# Patient Record
Sex: MALE | Race: WHITE
[De-identification: names, ages, dates, MRNs, and addresses within clinical notes are randomized per-mention and may not be internally consistent; named-entity substitution may affect disease eponyms.]

---

## 2017-01-13 ENCOUNTER — HOSPITAL ENCOUNTER (INPATIENT)
Dept: HOSPITAL 84 - D.ER | Age: 82
LOS: 7 days | Discharge: HOME | DRG: 280 | End: 2017-01-20
Attending: FAMILY MEDICINE | Admitting: FAMILY MEDICINE
Payer: MEDICARE

## 2017-01-13 VITALS
WEIGHT: 190.96 LBS | BODY MASS INDEX: 26.73 KG/M2 | BODY MASS INDEX: 26.73 KG/M2 | HEIGHT: 71 IN | WEIGHT: 190.96 LBS | BODY MASS INDEX: 26.73 KG/M2 | HEIGHT: 71 IN

## 2017-01-13 VITALS — SYSTOLIC BLOOD PRESSURE: 154 MMHG | DIASTOLIC BLOOD PRESSURE: 94 MMHG

## 2017-01-13 DIAGNOSIS — I50.21: ICD-10-CM

## 2017-01-13 DIAGNOSIS — I25.10: ICD-10-CM

## 2017-01-13 DIAGNOSIS — N25.81: ICD-10-CM

## 2017-01-13 DIAGNOSIS — I13.0: ICD-10-CM

## 2017-01-13 DIAGNOSIS — I21.4: Primary | ICD-10-CM

## 2017-01-13 DIAGNOSIS — N17.9: ICD-10-CM

## 2017-01-13 DIAGNOSIS — J44.9: ICD-10-CM

## 2017-01-13 DIAGNOSIS — D75.82: ICD-10-CM

## 2017-01-13 DIAGNOSIS — Z95.2: ICD-10-CM

## 2017-01-13 DIAGNOSIS — N18.4: ICD-10-CM

## 2017-01-13 LAB
ALBUMIN SERPL-MCNC: 3.6 G/DL (ref 3.4–5)
ALP SERPL-CCNC: 83 U/L (ref 46–116)
ALT SERPL-CCNC: 20 U/L (ref 10–68)
ANION GAP SERPL CALC-SCNC: 16.8 MMOL/L (ref 8–16)
APPEARANCE UR: CLEAR
BACTERIA #/AREA URNS HPF: (no result) /HPF
BASOPHILS NFR BLD AUTO: 1.1 % (ref 0–2)
BILIRUB SERPL-MCNC: 0.46 MG/DL (ref 0.2–1.3)
BILIRUB SERPL-MCNC: NEGATIVE MG/DL
BUN SERPL-MCNC: 44 MG/DL (ref 7–18)
CALCIUM SERPL-MCNC: 8.5 MG/DL (ref 8.5–10.1)
CHLORIDE SERPL-SCNC: 108 MMOL/L (ref 98–107)
CK MB SERPL-MCNC: 1.9 U/L (ref 0–3.6)
CK SERPL-CCNC: 75 UL (ref 21–232)
CO2 SERPL-SCNC: 23.3 MMOL/L (ref 21–32)
COLOR UR: YELLOW
CREAT SERPL-MCNC: 2.9 MG/DL (ref 0.6–1.3)
EOSINOPHIL NFR BLD: 8.2 % (ref 0–7)
ERYTHROCYTE [DISTWIDTH] IN BLOOD BY AUTOMATED COUNT: 14.7 % (ref 11.5–14.5)
GLOBULIN SER-MCNC: 3.3 G/L
GLUCOSE SERPL-MCNC: 92 MG/DL (ref 74–106)
GLUCOSE SERPL-MCNC: NEGATIVE MG/DL
HCT VFR BLD CALC: 36 % (ref 42–54)
HGB BLD-MCNC: 11.9 G/DL (ref 13.5–17.5)
IMM GRANULOCYTES NFR BLD: 0.4 % (ref 0–5)
KETONES UR STRIP-MCNC: NEGATIVE MG/DL
LEUKOCYTE ESTERASE: NEGATIVE
LYMPHOCYTES NFR BLD AUTO: 21.3 % (ref 15–50)
MCH RBC QN AUTO: 32.2 PG (ref 26–34)
MCHC RBC AUTO-ENTMCNC: 33.1 G/DL (ref 31–37)
MCV RBC: 97.6 FL (ref 80–100)
MONOCYTES NFR BLD: 11.6 % (ref 2–11)
NEUTROPHILS NFR BLD AUTO: 57.4 % (ref 40–80)
NITRITE UR-MCNC: NEGATIVE MG/ML
NT-PROBNP SERPL-MCNC: 1236 PG/ML (ref 0–450)
OSMOLALITY SERPL CALC.SUM OF ELEC: 295 MOSM/KG (ref 275–300)
PH UR STRIP: 5 [PH] (ref 5–6)
PLATELET # BLD EST: (no result) 10*3/UL
PLATELET # BLD: 74 10X3/UL (ref 130–400)
PMV BLD AUTO: 11 FL (ref 7.4–10.4)
POTASSIUM SERPL-SCNC: 5.1 MMOL/L (ref 3.5–5.1)
PROT SERPL-MCNC: 6.9 G/DL (ref 6.4–8.2)
PROT UR-MCNC: (no result) MG/DL
RBC # BLD AUTO: 3.69 10X6/UL (ref 4.2–6.1)
RBC #/AREA URNS HPF: (no result) /HPF (ref 0–5)
SODIUM SERPL-SCNC: 143 MMOL/L (ref 136–145)
SP GR UR STRIP: 1.01 (ref 1–1.02)
TROPONIN I SERPL-MCNC: 0.1 NG/ML (ref 0–0.06)
UROBILINOGEN UR-MCNC: NORMAL MG/DL
WBC # BLD AUTO: 7.5 10X3/UL (ref 4.8–10.8)
WBC #/AREA URNS HPF: (no result) /HPF (ref 0–5)

## 2017-01-13 NOTE — HEMODYNAMI
PATIENT:KIRA UP                                  MEDICAL RECORD: [id number]
: [birth year]                                            LOCATION:[facility]     D.2118
[id number]                                       ADMISSION DATE: 17
 
 
 Generatedon:20179:38
Patient name: KIRA UP Patient #: [id number] Visit #: [id number] SSN: 
: 1935
Date of study: 2017
Page: Of
Hemodynamic Procedure Report
****************************
Patient Data
Patient Demographics
Procedure consent was obtained
First Name: KIRA         Gender: Male
Last Name: JEOVANNY            : 1935
Middle Initial: J           Age: 81 year(s)
Patient #: [id number]       Race: 
Visit #: [id number]
Accession #:
[id number]
Additional ID: [id number]
Contact details
Address: [address] [place] [address] Phone: [phone]
State: [place]
City: [place]
Zip code: [address]
Past Medical History
Allergies: No known allergies
Admission
Admission Data
Admission Date: 2017   Admission Time: 16:49
Admit Source: Other         Insurance Payor: Private
Room #: D.2118              health insurance
Height (in.): 72            BSA: 2.06 (m2)
Height (cm.): 182.88        BMI: 25.09 (kg/m2)
Weight (lbs.): 185
Weight (kg.): 83.91
Procedure
Procedure Types
Cath Procedure
Diagnostic Procedure
LHC
LHC w/Coronaries w/Grafts
Procedure Description
Procedure Date
Procedure Date: 2017
Procedure Start Time: 9:14
Procedure End Time: 9:36
Procedure Staff
Name                            Function
Haroldo Estrada MD                   Performing Physician
Arely Mcqueen RT             Scrub
Jonn Ayala RN                  Nurse
Franco Barbosa RN                Circulator
Anurag Maldonado RT                  Monitor
Procedure Data
Cath Procedure
 
Fluoroscopy
Diagnostic fluoroscopy      Total fluoroscopy Time: 6.1
time: 6.1 min               min
Diagnostic fluoroscopy      Total fluoroscopy dose: 428
dose: 428 mGy               mGy
Contrast Material
Contrast Material Type                       Amount (ml)
Isovue 300                                   90
Entry Location
Entry     Primary  Successful  Side  Size  Upsize Upsize Entry    Closure Succes
sful  Closure
Location                             (Fr)  1 (Fr) 2 (Fr) Remarks  Device        
      Remarks
Femoral                        Right 5 Fr                         Exoseal
artery
Estimated blood loss: 5 ml
Diagnostic catheters
Device Type               Used For           End Catheter
Placement
Cordis 5Fr JL 4.0         Procedure
Catheter (MP)
Cordis Infinity 5Fr AR    Procedure
MOD Catheter
Cordis Infinity 5Fr AR 2  Procedure
MOD catheter
Cordis Infinity 5Fr IM    Procedure
catheter
Cordis Infinity 5Fr       Aortic Root
Pigtail catheter          Angiography
Procedure Complications
No complications
Procedure Medications
Medication           Administration Route Dosage
Oxygen               NC                   2 l/min
Lidocaine 2%         added to field       20
Heparin Flush Bag    added to field       2 bags
(1000units/500ml NS)
0.9% NaCl            I.V.                 100 ml/hr
Versed               I.V.                 1 mg
Fentanyl             I.V.                 50 mcg
Versed               I.V.                 0.5 mg
Fentanyl             I.V.                 25 mcg
Versed               I.V.                 0.5 mg
Fentanyl             I.V.                 25 mcg
Hemodynamics
Rest
BSA: 2.06 (m2) O2 Consumption: Estimated: 237.95 (ml/min) O2 Consumption indexed
:
Estimated:115.51 (ml/min/m) Heart Rate: 74 (bpm)
Snapshots
Pre Cath      Intra         NCS           Post Cath
Vital Signs
Time    Heart  Resp   SPO2 etCO2   RA5vsoq NIBP (mmHg) Rhythm  Pain    Sedation
Rate   (ipm)  (%)  (mmHg)  (mmHg)                      Status  Level
(bpm)
8:58:06 79     17     96   0       0       154/79(114) NSR     0 (11)  10(A)
, No
pain
9:02:22 76     16     100  0       0       147/86(124) NSR     0 (11)  10(A)
, No
 
pain
9:06:38 73     16     98   0       0       130/76(102) NSR     0 (11)  10(A)
, No
pain
9:10:50 72     23     96   0       0       137/69(123) NSR     0 (11)  10(A)
, No
pain
9:15:02 74     14     98   0       0       141/75(106) NSR     0 (11)  9(A)
, No
pain
9:19:14 76     16     98   0       0       134/79(112) NSR     0 (11)  9(A)
, No
pain
9:23:28 73     14     99   0       0       136/65(110) NSR     0 (11)  9(A)
, No
pain
9:27:40 79     14     96   0       0       123/75(97)  NSR     0 (11)  9(A)
, No
pain
9:31:45 82     17     97   0       0       128/79(96)  NSR     0 (11)  10(A)
, No
pain
9:35:57 73     13     98   0       0       137/69(103) NSR     0 (11)  10(A)
, No
pain
Medications
Time    Medication       Route  Dose  Verified Delivered Reason     Notes  Effec
tiveness
by       by
9:00:36 Oxygen           NC     2     Haroldo     Buffie    used for
l/min Sean Ayala RN   procedure
9:03:02 Lidocaine 2%     added  20ml  Haroldo     Haroldo      for local
to     vial  Sean Estrada MD  anesthetic
field
9:03:07 Heparin Flush    added  2     Haroldo     Haroldo      used for
Bag              to     bags  Sean Estrada MD  procedure
(1000units/500ml field
NS)
9:03:17 0.9% NaCl        I.V.   100   Haroldo     Buffie    Per
ml/hr Sean Ayala RN   physician
9:09:28 Versed           I.V.   1 mg  Haroldo     Buffie    for
Sean Ayala RN   sedation
9:09:34 Fentanyl         I.V.   50    Haroldo     Buffie    for
mcg   Sean Ayala RN   sedation
9:15:18 Versed           I.V.   0.5   Haroldo     Buffie    for
mg    Sean Ayala RN   sedation
9:15:23 Fentanyl         I.V.   25    Haroldo     Buffie    for
mcg   Sean Ayala RN   sedation
9:23:13 Versed           I.V.   0.5   Haroldo     Buffie    for
mg    Sean Ayala RN   sedation
9:23:17 Fentanyl         I.V.   25    Haroldo     Buffie    for
mcg   Sean Ayala RN   sedation
Procedure Log
Time     Note
8:45:26  Diagnostic Cath Status : Elective
8:45:53  Franco Barbosa RN sent for patient. Start room use.
8:45:54  Time tracking: Regular hours
8:45:57  Plan of Care:Hemodynamics will remain stable., Cardiac
rhythm will remain stable., Comfort level will be
maintained., Respiratory function will remain
 
adequate., Patient/ family verbilizes understanding of
procedure., Procedure tolerated without complication.,
Recovers from procedure without complications..
8:53:57  Patient received from PCU to CCL 2 Alert and oriented.
Tansferred to table in Supine position.
8:53:58  Warm blankets applied, and nathaniel hugger turned on for
patient comfort.
8:53:58  Correct patient and procedure confirmed by team.
8:53:59  Signed procedure consent form obtained from patient.
8:54:00  ECG and BP/O2 sat monitors applied to patient.
8:54:01  Full Disclosure recording started
8:56:56  Vital chart was started
9:00:36  Oxygen 2 l/min NC was given by Jonn Ayala RN; used
for procedure;
9:00:38  Baseline sample Acquired.
9:00:43  Rhythm: sinus rhythm
9:01:11  H&P Date Dictated: 2017 Within 30 days and on
chart..
9:01:12  Pre-procedure instructions explained to patient.
9:01:12  Pre-op teaching completed and patient verbalized
understanding.
9:01:15  Family in patients room.
9:01:16  Patient NPO since Midnight.
9:01:24  Patient allergic to No known allergies
9:01:27  Is the patient allergic to Iodine/contrast media? No.
9:01:29  Is patient on blood thinner?No
9:01:30  Patient diabetic? No.
9:01:36  Previous problem with sedation/anesthesia? No ?
9:01:37  Snore? No
9:01:38  Sleep apnea? No
9:01:39  Deviated septum? No
9:01:40  Opens mouth fully? Yes
9:01:41  Sticks out tongue? Yes
9:01:44  Airway obstruction? Yes COPD
9:01:47  Dentures? No ?
9:01:52  Pre procedure: right dorsailis pedis pulse 1+
Palpable, but thready & weak; easily obliterated
9:02:02  Patient pain scale 0/10 ?.
9:02:05  IV patent on arrival in left hand with 0.9% NaCl at
O.
9:02:55  Lab Result : Hemoglobin 11.4 g/dl
9:02:55  Lab Result : Creatinine 2.6 mg/dl
9:02:58  Lab results completed and on chart.
9:03:01  Right groin area was prepped with chlora-prep and
draped in sterile fashion
9:03:02  Lidocaine 2% 20ml vial added to field was given by
Haroldo Estrada MD; for local anesthetic;
9:03:03  Alarms reviewed by R. N.
9:03:03  Sharps counted by scrub and verified by R.N.
9:03:05  Use device set Femoral Dx
9:03:07  Heparin Flush Bag (1000units/500ml NS) 2 bags added to
field was given by Haroldo Estrada MD; used for procedure;
9:03:07  Tegaderm 4 x 4 opened to sterile field.
9:03:08  Acist Manifold opened to sterile field.
9:03:09  Acist Hand Control opened to sterile field.
9:03:10  Acist Syringe opened to sterile field.
9:03:10  Bag Decanter opened to sterile field.
9:03:10  Cardinal Cath Pack opened to sterile field.
9:03:11  Terumo 5Fr [place] Sheath opened to sterile field.
9:03:11  St Josh 260cm J .035 wire opened to sterile field.
 
9:03:17  0.9% NaCl 100 ml/hr I.V. was given by Jonn Ayala RN;
Per physician;
::18  --------ALL STOP TIME OUT------
9::18  Final Timeout: patient, procedure, and site verified
with staff and physician. All members of the team are
in agreement.
9:03:20  Right groin site verified by team.
9:03:22  Physical assessment completed. ASA score P 3 - A
patient with severe systemic disease as per Haroldo Estrada MD.
9:03:25  Sedation plan: IV Moderate Sedation Versed, Fentanyl
9:05:04  Admit Source: Other
9:05:12  Patient Height : 72 cm
9:05:21  Patient Weight : 185 kg
9:05:22  Insurance Payor : Private health insurance
9:05:44  **ACC** Patient presents with Unstable Angina CCS
Anginal Class 3--Marked limitation of physical
activity, angina occurs with ordinary activity..
9:05:47  **ACC**Patient has been prescribed/administered the
following anti-anginal medication within the last 2
weeks: None
9:08:37  Zero performed for pressure channel P1
9:08:40  Zero performed for pressure channel P1
9:08:53  Zero performed for pressure channel P1
9::28  Versed 1 mg I.V. was given by Jonn Ayala RN; for
sedation;
9:09:34  Fentanyl 50 mcg I.V. was given by Jonn Ayala RN; for
sedation;
9:09:39  Procedure type changed to Cath procedure, Diagnostic
procedure, LHC, LHC w/Coronaries w/Grafts
9:13:53  Procedure started.
9:14:07  Local anesthetic to right femoral artery with
Lidocaine 2% by Haroldo Estrada MD.**INITIAL ACCESS ONLY**
9:15:05  A 5 Fr sheath was inserted into the Right Femoral
artery
9:15:18  Versed 0.5 mg I.V. was given by Jonn Ayala RN; for
sedation;
9:15:23  Fentanyl 25 mcg I.V. was given by Jonn Ayala RN; for
sedation;
9:16:52  A Cordis 5Fr JL 4.0 Catheter () was advanced over
the wire and used for Procedure.
9:17:00  LCA angiography performed.
9:17:09  Catheter removed.
9:18:22  A Cordis Infinity 5Fr AR MOD Catheter was advanced
over the wire and used for Procedure.
9:19:04  Catheter removed.
9:19:17  A Cordis Infinity 5Fr AR 2 MOD catheter was advanced
over the wire and used for Procedure.
9:21:16  SVG TO DIAG AND OM
9:22:14  SVG to RCA angiography performed.
9:23:08  Catheter removed.
9:23:13  Versed 0.5 mg I.V. was given by Jonn Ayala RN; for
sedation;
9:23:17  Fentanyl 25 mcg I.V. was given by Jonn Ayala RN; for
sedation;
9:23:40  A Cordis Infinity 5Fr IM catheter was advanced over
the wire and used for Procedure.
9:27:05  LIMA to LAD angiography performed.
9:28:21  Catheter removed.
9:29:20  A Cordis Infinity 5Fr Pigtail catheter was advanced
 
over the wire and used for Aortic Root Angiography.
9:29:51  Aortic Root visualized
9:30:15  Catheter removed.
9:31:16  Cordis 5Fr Exoseal opened to sterile field.
9:31:33  Sheath removed intact; hemostasis achieved with
Exoseal to the Right Femoral artery.
9:31:39  Procedure ended.(Physican Out)
9:33:58  Fluoroscopy time 06.10 minutes.
9:34:01  Fluoroscopy dose: 428 mGy
9:34:01  Flurop Dose total: 428
9:34:16  Contrast amount:Isovue 300 90ml.
9:34:17  Sharps counted by scrub and verified by R.N.
9:34:22  Insertion/operative site no bleeding no hematoma.
9:34:24  Post-op/insertion site Right Femoral artery dressed
using a 4 x 4 and Tegaderm.
9:34:28  Post right femoral artery:stable, soft, clean and dry
9:34:30  Post Procedure Pulses reassessed and unchanged
9:34:34  Post-procedure physical assessment completed. ASA
score P 3 - A patient with severe systemic disease as
per Haroldo Estrada MD.
9:34:37  Post procedure rhythm: unchanged.
9:34:45  Estimated blood loss: 5 ml
9:34:46  Post procedure instruction explained to
patient.Patient verbalizes understanding.
9:34:47  Patient needs reinforcement of post procedure
teaching.
9:36:26  Procedure and supply charges have been captured,
reviewed, submitted and are correct.
9:36:30  Procedure Complication : No complications
9:36:33  Vital chart was stopped
9:36:46  See physician's report for complete and final results.
9:36:49  Report given to PCU.
9:36:52  Patient transfered to PCU with Stretcher.
9:36:54  Procedure ended.
9:36:54  Full Disclosure recording stopped
9:37:09  End room use (Document Last)
Device Usage
Item     Manufacture  Quantity  Catalog     Hospital Part    Current Minimal Lot
# /
Name                            Number      Charge   Number  Stock   Stock   Ser
ial#
Code
Tegaderm            1         [id number]       [id number]   997212  [id number]  5
4 x 4
Acist    Acist        1         35477       758200   659039  003632  5
Manifold Medical
Systems Inc
Acist    Acist        1         56923       741415   467959  510184  5
Hand     Medical
Control  Systems Inc
Acist    Acist        1         20269       518167   570377  012215  20
Syringe  Medical
Systems Inc
Bag      Microtek     1         S       993549   14964   625597  5
Decanter [employer] Inc.
Cardinal Cardinal     1         [id number]  881448   14235   402989  5
Cath     Health
Pack
Terumo   Terumo       1         [id number]      487971   877109  671728  40
5Fr
 
[place]
Sheath
St Josh  St Josh      1         909391      075852   924284  180650  30
260cm J
.035
wire
Cordis   Cardinal     1                                      652555  5
5Fr Dynamics Expert   Health
4.0
Catheter
(MP)
Cordis   Cardinal     1         [id number]     695327   929945  017406  15
KOJI Drinks
5Fr AR
MOD
Catheter
Cordis   Cardinal     1         [id number]     332485   741190  744873  20
Infinity Health
5Fr AR 2
MOD
catheter
Cordis   Cardinal     1         495071T     080448   190006  153411  5
Infinity Health
5Fr IM
catheter
Cordis   Cardinal     1         155190S     305959   598498  681251  5
Infinity Health
5Fr
Pigtail
catheter
Cordis   Cardinal     1                625818   292030  408534  10
5Fr      Health
Exoseal
Signature Audit [place]
Stage           Time        Signature      Unsigned
Intra-Procedure 2017   Anurag Maldonado
9:38:05 AM  RT(R)
Signatures
Monitor : Anurag Maldonado RT Signature :
______________________________
Date : ______________ Time :
______________
 
 
 
 
 
 
 
 
 
 
 
 
 
[address] [place] [address]

## 2017-01-14 VITALS — DIASTOLIC BLOOD PRESSURE: 80 MMHG | SYSTOLIC BLOOD PRESSURE: 179 MMHG

## 2017-01-14 VITALS — SYSTOLIC BLOOD PRESSURE: 157 MMHG | DIASTOLIC BLOOD PRESSURE: 58 MMHG

## 2017-01-14 VITALS — DIASTOLIC BLOOD PRESSURE: 60 MMHG | SYSTOLIC BLOOD PRESSURE: 131 MMHG

## 2017-01-14 VITALS — SYSTOLIC BLOOD PRESSURE: 141 MMHG | DIASTOLIC BLOOD PRESSURE: 75 MMHG

## 2017-01-14 VITALS — DIASTOLIC BLOOD PRESSURE: 84 MMHG | SYSTOLIC BLOOD PRESSURE: 165 MMHG

## 2017-01-14 VITALS — DIASTOLIC BLOOD PRESSURE: 65 MMHG | SYSTOLIC BLOOD PRESSURE: 131 MMHG

## 2017-01-14 LAB
CREATININE - URINE: 55.5 MG/DL (ref 30–125)
PROT UR-MCNC: 132.6 MG/DL (ref 0–11.9)

## 2017-01-14 NOTE — HP
PATIENT: KIRA UP                                 MEDICAL RECORD: [id number]
ACCOUNT: [id number]                                    LOCATION:[address]
: [birth year]                                            ADMISSION DATE: 17
                                                         
 
                             HISTORY AND PHYSICAL EXAMINATION
 
 
HISTORY OF PRESENT ILLNESS:  An 81-year-old  male, had acute episode of
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 
He had been going up and down the stairs and felt very short of breath, felt
like his heart was racing, sat down and felt like he is going to pass out.  He
was evaluated there and shipped to the urgent care clinic for further evaluation
and transferred to the Emergency Room via ambulance.
 
PAST MEDICAL HISTORY:  Significant for significant cardiovascular disease, has
had quadruple bypass, valve replacement and abdominal aortic aneurysm repair. 
His cardiologist is Dr. Walton and he also has a history of COPD and sees Dr. Crabtree.  Also, has had recent hernia repair and prior remote hernia repair. 
Former smoker.
 
CURRENT MEDICATIONS:  Inhalers from Dr. Crabtree.  Recent medication:  Not clear on
the name, he does not have with him, but for pulmonary hypertension.  Plavix and
B12.
 
ALLERGIES:  No known drug allergies.
 
REVIEW OF SYSTEMS:
GENERAL:  No acute change in weight or appetite.
HEENT:  No cephalgia, visual changes, tinnitus, epistaxis or dysphagia.
CARDIOVASCULAR:  Significant for the above.
PULMONARY:  Denies hemoptysis, denies night sweats.  Does admit to shortness of
breath.
GASTROINTESTINAL:  Denies hematemesis, hematochezia or melena.
GENITOURINARY:  Denies dysuria.  Denies any change in frequency.
MUSCULOSKELETAL:  No acute changes.
ENDOCRINE:  Denies polyuria, polydipsia, or polyphagia.
 
PHYSICAL EXAMINATION:
VITAL SIGNS:  Temperature 98.2, blood pressure is 156/89, heart rate 84,
respirations 24, and O2 sats 84% room air.
GENERAL:  Alert, oriented, mild distress secondary to above symptoms somewhat
improved with supplemental O2.
HEENT:  Head is normocephalic and atraumatic.  Eyes:  Pupils equally round and
reactive to light and accommodation.  Extraocular muscles intact.  Conjunctiva
are not injected.  Ears:  Canals patent, TMs are intact.  Nose:  Nares patent
without drainage.  Throat:  No erythema, no exudates.
NECK:  Supple.  No lymphadenopathy.
HEART:  Regular rate and rhythm.  No S3 or S4.  No rub.
LUNGS:  Clear to auscultation bilaterally.  Breathing is nonlabored.
ABDOMEN:  Soft and nontender.  Bowel sounds all 4 quadrants.
EXTREMITIES:  Present times 4, no edema.
NEUROLOGIC:  No focal deficits.
 
DIAGNOSTIC DATA:  EKG shows normal sinus rhythm, left axis deviation, LVH, QRS
widening, ventricular rate 69.
 
LABORATORY DATA:  INR is 1.14, CK-MB 1.9, troponin 0.096.  BNP 1236, creatinine
 
 
 
HISTORY AND PHYSICAL                           [id number]    KIRA UP         
 
 
elevated.
 
ASSESSMENT AND PLAN:  Acute congestive heart failure, likely non-ST elevation
myocardial infarction, chronic kidney disease.  The patient is admitted, cycle
enzymes.  Cardiology consulted, nephrology consulted.  Resume home medications. 
Supportive care with supplemental O2, DuoNebs.
 
TRANSINT:[id number] Voice Confirmation ID: [id number] DOCUMENT ID: [id number]
 
 
                                           
                                           EUSEBIA MOHAMUD DO            
 
 
 
Electronically Signed by EUSEBIA HOWE on 17 at 1321
 
 
 
 
 
 
 
 
 
 
 
 
 
 
 
 
 
 
 
 
 
 
 
 
 
 
 
 
 
 
 
CC:                                                             [id number]
DICTATION DATE: 17     :     17      ADM IN  
                                                                              
[address] [place] [address]

## 2017-01-14 NOTE — NUR
PT RESTING WELL WITHOUT C/O OR DISTRESS NOTED. NO NEEDS VOICED. CALL LIGHT
WITHIN REACH. WILL CONT TO MONITOR.

## 2017-01-14 NOTE — NUR
ASSESSMENT COMPLETED. TELEMERTY SHOWS SR. IV TO LEFT FOREARM  AT 75 CC/HR. UP
AB LINDA. DENIES ANY NEEDS. CALL LIGHT IN REACH WITH SR UP

## 2017-01-15 VITALS — SYSTOLIC BLOOD PRESSURE: 153 MMHG | DIASTOLIC BLOOD PRESSURE: 85 MMHG

## 2017-01-15 VITALS — DIASTOLIC BLOOD PRESSURE: 66 MMHG | SYSTOLIC BLOOD PRESSURE: 117 MMHG

## 2017-01-15 VITALS — DIASTOLIC BLOOD PRESSURE: 80 MMHG | SYSTOLIC BLOOD PRESSURE: 148 MMHG

## 2017-01-15 VITALS — SYSTOLIC BLOOD PRESSURE: 121 MMHG | DIASTOLIC BLOOD PRESSURE: 43 MMHG

## 2017-01-15 VITALS — DIASTOLIC BLOOD PRESSURE: 43 MMHG | SYSTOLIC BLOOD PRESSURE: 130 MMHG

## 2017-01-15 VITALS — SYSTOLIC BLOOD PRESSURE: 105 MMHG | DIASTOLIC BLOOD PRESSURE: 60 MMHG

## 2017-01-15 LAB
ANION GAP SERPL CALC-SCNC: 14.2 MMOL/L (ref 8–16)
BASOPHILS NFR BLD AUTO: 0.5 % (ref 0–2)
BUN SERPL-MCNC: 52 MG/DL (ref 7–18)
C3 SERPL-MCNC: 80 MG/DL (ref 90–207)
C4 SERPL-MCNC: 17.7 MG/DL (ref 17.4–52.2)
CALCIUM SERPL-MCNC: 8 MG/DL (ref 8.5–10.1)
CHLORIDE SERPL-SCNC: 112 MMOL/L (ref 98–107)
CO2 SERPL-SCNC: 23.6 MMOL/L (ref 21–32)
CREAT SERPL-MCNC: 2.8 MG/DL (ref 0.6–1.3)
EOSINOPHIL NFR BLD: 5.6 % (ref 0–7)
ERYTHROCYTE [DISTWIDTH] IN BLOOD BY AUTOMATED COUNT: 14.9 % (ref 11.5–14.5)
GLUCOSE SERPL-MCNC: 90 MG/DL (ref 74–106)
HCT VFR BLD CALC: 33 % (ref 42–54)
HGB BLD-MCNC: 10.9 G/DL (ref 13.5–17.5)
IMM GRANULOCYTES NFR BLD: 0.2 % (ref 0–5)
LYMPHOCYTES NFR BLD AUTO: 27.6 % (ref 15–50)
MCH RBC QN AUTO: 31.9 PG (ref 26–34)
MCHC RBC AUTO-ENTMCNC: 33 G/DL (ref 31–37)
MCV RBC: 96.5 FL (ref 80–100)
MONOCYTES NFR BLD: 9.3 % (ref 2–11)
NEUTROPHILS NFR BLD AUTO: 56.8 % (ref 40–80)
OSMOLALITY SERPL CALC.SUM OF ELEC: 302 MOSM/KG (ref 275–300)
PLATELET # BLD: 79 10X3/UL (ref 130–400)
PMV BLD AUTO: 11 FL (ref 7.4–10.4)
POTASSIUM SERPL-SCNC: 4.8 MMOL/L (ref 3.5–5.1)
RBC # BLD AUTO: 3.42 10X6/UL (ref 4.2–6.1)
SODIUM SERPL-SCNC: 145 MMOL/L (ref 136–145)
WBC # BLD AUTO: 6.6 10X3/UL (ref 4.8–10.8)

## 2017-01-15 NOTE — NUR
INITIAL ROUNDS COMPLETED AT 1915 HRS.  PT DENIED ANUY DISCOMFORT.  ASSESSMENT
COMPLETED AT 2000 HRS.  VSS.  SR WITH BBB PER CM HR 86.  IV TO LFA WITH D5NS
AT 100CC/HR.  IV PATENT.  LUNGS DIMINISHED IN BASES BILAT.  SYSTOLIC MURMUR
NOTED.  PT UP AD LINDA.  PT CURRENTLY WATCHING TV; DENIES ANY DISCOMFORT.  SR UP
X2, CALL LIGHT WITHIN REACH.

## 2017-01-15 NOTE — NUR
ASSESSMENT COMPLETED. DENIES ANY NEEDS. TELEMERTY SHOWS SR 68. IV TO LEFT AC
WITH D5NS AT 75. CALL LIGHT IN REACH WITH SR UP. WILL MONITOR

## 2017-01-16 VITALS — SYSTOLIC BLOOD PRESSURE: 148 MMHG | DIASTOLIC BLOOD PRESSURE: 77 MMHG

## 2017-01-16 VITALS — DIASTOLIC BLOOD PRESSURE: 58 MMHG | SYSTOLIC BLOOD PRESSURE: 145 MMHG

## 2017-01-16 VITALS — SYSTOLIC BLOOD PRESSURE: 153 MMHG | DIASTOLIC BLOOD PRESSURE: 83 MMHG

## 2017-01-16 LAB
ANION GAP SERPL CALC-SCNC: 16 MMOL/L (ref 8–16)
BASOPHILS NFR BLD AUTO: 0.8 % (ref 0–2)
BUN SERPL-MCNC: 51 MG/DL (ref 7–18)
CALCIUM SERPL-MCNC: 8.1 MG/DL (ref 8.5–10.1)
CHLORIDE SERPL-SCNC: 112 MMOL/L (ref 98–107)
CK MB SERPL-MCNC: 1.4 U/L (ref 0–3.6)
CO2 SERPL-SCNC: 21.5 MMOL/L (ref 21–32)
CREAT SERPL-MCNC: 2.6 MG/DL (ref 0.6–1.3)
EOSINOPHIL NFR BLD: 9 % (ref 0–7)
ERYTHROCYTE [DISTWIDTH] IN BLOOD BY AUTOMATED COUNT: 14.9 % (ref 11.5–14.5)
GLUCOSE SERPL-MCNC: 99 MG/DL (ref 74–106)
HCT VFR BLD CALC: 30.8 % (ref 42–54)
HGB BLD-MCNC: 10.5 G/DL (ref 13.5–17.5)
IMM GRANULOCYTES NFR BLD: 0.3 % (ref 0–5)
LYMPHOCYTES NFR BLD AUTO: 27.4 % (ref 15–50)
MCH RBC QN AUTO: 32.7 PG (ref 26–34)
MCHC RBC AUTO-ENTMCNC: 34.1 G/DL (ref 31–37)
MCV RBC: 96 FL (ref 80–100)
MONOCYTES NFR BLD: 8.5 % (ref 2–11)
NEUTROPHILS NFR BLD AUTO: 54 % (ref 40–80)
OSMOLALITY SERPL CALC.SUM OF ELEC: 302 MOSM/KG (ref 275–300)
PLATELET # BLD: 70 10X3/UL (ref 130–400)
PMV BLD AUTO: 10 FL (ref 7.4–10.4)
POTASSIUM SERPL-SCNC: 4.5 MMOL/L (ref 3.5–5.1)
RBC # BLD AUTO: 3.21 10X6/UL (ref 4.2–6.1)
SODIUM SERPL-SCNC: 145 MMOL/L (ref 136–145)
TROPONIN I SERPL-MCNC: 0.14 NG/ML (ref 0–0.06)
WBC # BLD AUTO: 6 10X3/UL (ref 4.8–10.8)

## 2017-01-17 VITALS — SYSTOLIC BLOOD PRESSURE: 139 MMHG | DIASTOLIC BLOOD PRESSURE: 86 MMHG

## 2017-01-17 VITALS — SYSTOLIC BLOOD PRESSURE: 141 MMHG | DIASTOLIC BLOOD PRESSURE: 82 MMHG

## 2017-01-17 VITALS — DIASTOLIC BLOOD PRESSURE: 89 MMHG | SYSTOLIC BLOOD PRESSURE: 155 MMHG

## 2017-01-17 VITALS — DIASTOLIC BLOOD PRESSURE: 73 MMHG | SYSTOLIC BLOOD PRESSURE: 125 MMHG

## 2017-01-17 VITALS — SYSTOLIC BLOOD PRESSURE: 175 MMHG | DIASTOLIC BLOOD PRESSURE: 73 MMHG

## 2017-01-17 VITALS — DIASTOLIC BLOOD PRESSURE: 67 MMHG | SYSTOLIC BLOOD PRESSURE: 137 MMHG

## 2017-01-17 LAB
ALBUMIN SERPL ELPH-MCNC: 3.1 G/DL (ref 2.9–4.4)
ALBUMIN/GLOB SERPL: 1.1 {RATIO} (ref 0.7–1.7)
ALPHA1 GLOB SERPL ELPH-MCNC: 0.2 G/DL (ref 0–0.4)
ALPHA2 GLOB SERPL ELPH-MCNC: 0.7 G/DL (ref 0.4–1)
ANION GAP SERPL CALC-SCNC: 16 MMOL/L (ref 8–16)
APTT BLD: 32.1 SECONDS (ref 22.8–39.4)
B-GLOBULIN SERPL ELPH-MCNC: 0.9 G/DL (ref 0.7–1.3)
BASOPHILS NFR BLD AUTO: 1 % (ref 0–2)
BUN SERPL-MCNC: 47 MG/DL (ref 7–18)
CALCIUM SERPL-MCNC: 8.2 MG/DL (ref 8.5–10.1)
CHLORIDE SERPL-SCNC: 113 MMOL/L (ref 98–107)
CO2 SERPL-SCNC: 20.5 MMOL/L (ref 21–32)
CREAT SERPL-MCNC: 2.5 MG/DL (ref 0.6–1.3)
EOSINOPHIL NFR BLD: 12.2 % (ref 0–7)
ERYTHROCYTE [DISTWIDTH] IN BLOOD BY AUTOMATED COUNT: 15 % (ref 11.5–14.5)
GAMMA GLOB SERPL ELPH-MCNC: 0.9 G/DL (ref 0.4–1.8)
GLUCOSE SERPL-MCNC: 96 MG/DL (ref 74–106)
HCT VFR BLD CALC: 31.8 % (ref 42–54)
HCV AB S/CO SERPL IA: <0.1 (ref 0–0.9)
HGB BLD-MCNC: 10.8 G/DL (ref 13.5–17.5)
IMM GRANULOCYTES NFR BLD: 0.3 % (ref 0–5)
INR PPP: 1.21 (ref 0.85–1.17)
LYMPHOCYTES NFR BLD AUTO: 24.9 % (ref 15–50)
MCH RBC QN AUTO: 32.2 PG (ref 26–34)
MCHC RBC AUTO-ENTMCNC: 34 G/DL (ref 31–37)
MCV RBC: 94.9 FL (ref 80–100)
MONOCYTES NFR BLD: 10.3 % (ref 2–11)
NEUTROPHILS NFR BLD AUTO: 51.3 % (ref 40–80)
OSMOLALITY SERPL CALC.SUM OF ELEC: 300 MOSM/KG (ref 275–300)
PLATELET # BLD: 65 10X3/UL (ref 130–400)
PMV BLD AUTO: 9.9 FL (ref 7.4–10.4)
POTASSIUM SERPL-SCNC: 4.5 MMOL/L (ref 3.5–5.1)
PROT SERPL-MCNC: 5.8 G/DL (ref 6–8.5)
PROTHROMBIN TIME: 15.2 SECONDS (ref 11.6–15)
RBC # BLD AUTO: 3.35 10X6/UL (ref 4.2–6.1)
SODIUM SERPL-SCNC: 145 MMOL/L (ref 136–145)
WBC # BLD AUTO: 6.3 10X3/UL (ref 4.8–10.8)

## 2017-01-17 NOTE — NUR
PT ASSESSMENT COMPLETED NO DISTRESS OBSERVED PT LAYING IN BED ON ROOM AIR
RESPERATIONS EVEN AND UNLABORED, PT STATED " I BREATH OKAY UNLESS I WALK A
CERTIAN DISTANCE THEN I GET SHORT OF BREATH" ADVISED  PT ABOUT WEARING O2 WHEN
WALKING AND PT STATED " I DONT WANT TO DO THAT BECAUSE THEN I WILL NEVER GET
OFF OF IT" TEACHING FOR COPD AND CHF DONE AND PT VERBALIZED UNDERSTANDING.
SRX2 BED LOW AND LOCKED CALL LIGHT IN REACH WILL MONITOR

## 2017-01-18 VITALS — SYSTOLIC BLOOD PRESSURE: 156 MMHG | DIASTOLIC BLOOD PRESSURE: 87 MMHG

## 2017-01-18 VITALS — DIASTOLIC BLOOD PRESSURE: 81 MMHG | SYSTOLIC BLOOD PRESSURE: 144 MMHG

## 2017-01-18 VITALS — SYSTOLIC BLOOD PRESSURE: 156 MMHG | DIASTOLIC BLOOD PRESSURE: 51 MMHG

## 2017-01-18 VITALS — DIASTOLIC BLOOD PRESSURE: 70 MMHG | SYSTOLIC BLOOD PRESSURE: 140 MMHG

## 2017-01-18 VITALS — SYSTOLIC BLOOD PRESSURE: 149 MMHG | DIASTOLIC BLOOD PRESSURE: 76 MMHG

## 2017-01-18 VITALS — SYSTOLIC BLOOD PRESSURE: 149 MMHG | DIASTOLIC BLOOD PRESSURE: 80 MMHG

## 2017-01-18 LAB
ANION GAP SERPL CALC-SCNC: 15.8 MMOL/L (ref 8–16)
BASOPHILS NFR BLD AUTO: 0.8 % (ref 0–2)
BUN SERPL-MCNC: 56 MG/DL (ref 7–18)
CALCIUM SERPL-MCNC: 8.5 MG/DL (ref 8.5–10.1)
CHLORIDE SERPL-SCNC: 107 MMOL/L (ref 98–107)
CO2 SERPL-SCNC: 24.5 MMOL/L (ref 21–32)
CREAT SERPL-MCNC: 2.6 MG/DL (ref 0.6–1.3)
EOSINOPHIL NFR BLD: 10.5 % (ref 0–7)
ERYTHROCYTE [DISTWIDTH] IN BLOOD BY AUTOMATED COUNT: 15.1 % (ref 11.5–14.5)
GBM IGG SER-ACNC: 5 UNITS (ref 0–20)
GLUCOSE SERPL-MCNC: 129 MG/DL (ref 74–106)
HCT VFR BLD CALC: 34.1 % (ref 42–54)
HGB BLD-MCNC: 11.4 G/DL (ref 13.5–17.5)
IMM GRANULOCYTES NFR BLD: 0.3 % (ref 0–5)
LYMPHOCYTES NFR BLD AUTO: 22.1 % (ref 15–50)
MCH RBC QN AUTO: 32.5 PG (ref 26–34)
MCHC RBC AUTO-ENTMCNC: 33.4 G/DL (ref 31–37)
MCV RBC: 97.2 FL (ref 80–100)
MONOCYTES NFR BLD: 7.6 % (ref 2–11)
NEUTROPHILS NFR BLD AUTO: 58.7 % (ref 40–80)
OSMOLALITY SERPL CALC.SUM OF ELEC: 300 MOSM/KG (ref 275–300)
PLATELET # BLD: 67 10X3/UL (ref 130–400)
PMV BLD AUTO: 10.1 FL (ref 7.4–10.4)
POTASSIUM SERPL-SCNC: 5.3 MMOL/L (ref 3.5–5.1)
RBC # BLD AUTO: 3.51 10X6/UL (ref 4.2–6.1)
SODIUM SERPL-SCNC: 142 MMOL/L (ref 136–145)
WBC # BLD AUTO: 7.3 10X3/UL (ref 4.8–10.8)

## 2017-01-19 VITALS — SYSTOLIC BLOOD PRESSURE: 134 MMHG | DIASTOLIC BLOOD PRESSURE: 77 MMHG

## 2017-01-19 VITALS — DIASTOLIC BLOOD PRESSURE: 67 MMHG | SYSTOLIC BLOOD PRESSURE: 142 MMHG

## 2017-01-19 VITALS — DIASTOLIC BLOOD PRESSURE: 70 MMHG | SYSTOLIC BLOOD PRESSURE: 134 MMHG

## 2017-01-19 VITALS — SYSTOLIC BLOOD PRESSURE: 140 MMHG | DIASTOLIC BLOOD PRESSURE: 65 MMHG

## 2017-01-19 VITALS — SYSTOLIC BLOOD PRESSURE: 142 MMHG | DIASTOLIC BLOOD PRESSURE: 84 MMHG

## 2017-01-19 VITALS — SYSTOLIC BLOOD PRESSURE: 136 MMHG | DIASTOLIC BLOOD PRESSURE: 78 MMHG

## 2017-01-19 VITALS — DIASTOLIC BLOOD PRESSURE: 77 MMHG | SYSTOLIC BLOOD PRESSURE: 139 MMHG

## 2017-01-19 VITALS — SYSTOLIC BLOOD PRESSURE: 130 MMHG | DIASTOLIC BLOOD PRESSURE: 74 MMHG

## 2017-01-19 VITALS — SYSTOLIC BLOOD PRESSURE: 146 MMHG | DIASTOLIC BLOOD PRESSURE: 78 MMHG

## 2017-01-19 VITALS — SYSTOLIC BLOOD PRESSURE: 116 MMHG | DIASTOLIC BLOOD PRESSURE: 67 MMHG

## 2017-01-19 LAB
ANION GAP SERPL CALC-SCNC: 15.4 MMOL/L (ref 8–16)
BASOPHILS NFR BLD AUTO: 0.9 % (ref 0–2)
BUN SERPL-MCNC: 52 MG/DL (ref 7–18)
C-ANCA TITR SER IF: (no result) TITER
CALCIUM SERPL-MCNC: 8.1 MG/DL (ref 8.5–10.1)
CHLORIDE SERPL-SCNC: 107 MMOL/L (ref 98–107)
CO2 SERPL-SCNC: 24.7 MMOL/L (ref 21–32)
CREAT SERPL-MCNC: 2.4 MG/DL (ref 0.6–1.3)
EOSINOPHIL NFR BLD: 9.6 % (ref 0–7)
ERYTHROCYTE [DISTWIDTH] IN BLOOD BY AUTOMATED COUNT: 14.9 % (ref 11.5–14.5)
GLUCOSE SERPL-MCNC: 92 MG/DL (ref 74–106)
HCT VFR BLD CALC: 32.2 % (ref 42–54)
HGB BLD-MCNC: 10.8 G/DL (ref 13.5–17.5)
IMM GRANULOCYTES NFR BLD: 0.4 % (ref 0–5)
LYMPHOCYTES NFR BLD AUTO: 23.6 % (ref 15–50)
MCH RBC QN AUTO: 32 PG (ref 26–34)
MCHC RBC AUTO-ENTMCNC: 33.5 G/DL (ref 31–37)
MCV RBC: 95.5 FL (ref 80–100)
MONOCYTES NFR BLD: 10 % (ref 2–11)
MYELOPEROXIDASE AB SER IA-ACNC: <9 U/ML (ref 0–9)
NEUTROPHILS NFR BLD AUTO: 55.5 % (ref 40–80)
OSMOLALITY SERPL CALC.SUM OF ELEC: 296 MOSM/KG (ref 275–300)
P-ANCA ATYPICAL TITR SER IF: (no result) TITER
P-ANCA TITR SER IF: (no result) TITER
PLATELET # BLD: 78 10X3/UL (ref 130–400)
PMV BLD AUTO: 11.2 FL (ref 7.4–10.4)
POTASSIUM SERPL-SCNC: 5.1 MMOL/L (ref 3.5–5.1)
PROTEINASE3 AB SER IA-ACNC: <3.5 U/ML (ref 0–3.5)
RBC # BLD AUTO: 3.37 10X6/UL (ref 4.2–6.1)
SODIUM SERPL-SCNC: 142 MMOL/L (ref 136–145)
WBC # BLD AUTO: 7 10X3/UL (ref 4.8–10.8)

## 2017-01-19 PROCEDURE — B211YZZ FLUOROSCOPY OF MULTIPLE CORONARY ARTERIES USING OTHER CONTRAST: ICD-10-PCS | Performed by: INTERNAL MEDICINE

## 2017-01-19 PROCEDURE — B215YZZ FLUOROSCOPY OF LEFT HEART USING OTHER CONTRAST: ICD-10-PCS | Performed by: INTERNAL MEDICINE

## 2017-01-19 PROCEDURE — B213YZZ FLUOROSCOPY OF MULTIPLE CORONARY ARTERY BYPASS GRAFTS USING OTHER CONTRAST: ICD-10-PCS | Performed by: INTERNAL MEDICINE

## 2017-01-19 PROCEDURE — B310YZZ FLUOROSCOPY OF THORACIC AORTA USING OTHER CONTRAST: ICD-10-PCS | Performed by: INTERNAL MEDICINE

## 2017-01-19 NOTE — NUR
Patient Name: KIRA UP                                   Admission
Status: ER
Accout number: [id number]                            Admission Date:
2017
: 1935                                                      Admission
Diagnosis:DYSPNEA, UNSPECIFIED
Attending: DAMION                                              Current
LOS:  6
 
Anticipated DC Date:
 
Planned Disposition: Home
Primary Insurance: [employer] [facility] PF
 
 
Discharge Planning Comments: CM met with patient's spouse "Soledad Up" at
bedside. The patient has currently gone for procedure. At this time, the
patient's spouse answered all questions. Spouse states the patient resides at
home with her and is independent of all ADL's. They reside in a single story
home with 3 steps leading into the front door. She states the last
hospitalization was a hernia repair 5-6 month's ago here at [facility]. She states
the home environment is safe to return to and will notify CM should the
patient need DME/HH. CM will follow up and revisit when patient returns to
room. CM will also follow and assist as needed with discharge planning/needs.
 
: Sheyla Wan RN/CM
 
No. Question Answer Score
* How many steps to enter\exit or inside your home? 3  0
* PCP Dr. Perez  0
* [facility] [phone]  0
* Preadmission Environment Home with Family  0
* ADLs Independent  0
* Equipment None  0
* List name and contact numbers for known caregivers / representatives who
currently or will assist patient after discharge: Soledad Up (spouse)
[phone]  0
* Community resources currently utilized None  0
* Additional services required to return to the preadmission environment? No
0
* Can the patient safely return to the preadmission environment? Yes  0
* Has this patient been hospitalized within the prior 30 days at any hospital?
No  0
    Grand Total:  0

## 2017-01-19 NOTE — NUR
PT LAYING IN BED NO DISTRESS OBSERVED PT NOTIFIED OF NPO STATUS AFTER MIDNIGHT
FOR CATH IN AM AND PT VERBALIZED UNDERSTANDING CALL LIGHT IN REACH SRX2 BED
LOW AND LOCKED WILL MONITOR

## 2017-01-19 NOTE — NUR
RESUMED CARE OF PT, LYING IN BED RESPIRATIONS EVEN AND UNLABORED ON 2LPM VIA
NC.  78 SR ON TELEMETRY.  LEFT FOREARM INFUSING NA BICARB @ 75.  RIGHT GROIN
C/D/I WITH PEDAL PULSE PALPABLE.  NO NEEDS VOICED AT THIS TIME.  WILL CONTINUE
TO MONITOR.  SEE NURSE ASSESSMENT.  CALL LIGHT IN REACH.

## 2017-01-19 NOTE — NUR
UP AMBULATING HALLWAYS, EXPERIENCED SHORTNESS OF BREATH.  STATES THIS HAPPENS
AT HOME AS WELL.  RECOVERED AFTER APPROX 3MIN.  BACK TO ROOM.

## 2017-01-20 VITALS — DIASTOLIC BLOOD PRESSURE: 81 MMHG | SYSTOLIC BLOOD PRESSURE: 113 MMHG

## 2017-01-20 VITALS — SYSTOLIC BLOOD PRESSURE: 138 MMHG | DIASTOLIC BLOOD PRESSURE: 74 MMHG

## 2017-01-20 VITALS — DIASTOLIC BLOOD PRESSURE: 65 MMHG | SYSTOLIC BLOOD PRESSURE: 129 MMHG

## 2017-01-20 VITALS — SYSTOLIC BLOOD PRESSURE: 129 MMHG | DIASTOLIC BLOOD PRESSURE: 69 MMHG

## 2017-01-20 LAB
ANION GAP SERPL CALC-SCNC: 13.4 MMOL/L (ref 8–16)
BASOPHILS NFR BLD AUTO: 1.2 % (ref 0–2)
BUN SERPL-MCNC: 49 MG/DL (ref 7–18)
CALCIUM SERPL-MCNC: 8 MG/DL (ref 8.5–10.1)
CHLORIDE SERPL-SCNC: 106 MMOL/L (ref 98–107)
CO2 SERPL-SCNC: 26.6 MMOL/L (ref 21–32)
CREAT SERPL-MCNC: 2.5 MG/DL (ref 0.6–1.3)
EOSINOPHIL NFR BLD: 12.5 % (ref 0–7)
ERYTHROCYTE [DISTWIDTH] IN BLOOD BY AUTOMATED COUNT: 14.8 % (ref 11.5–14.5)
GLUCOSE SERPL-MCNC: 92 MG/DL (ref 74–106)
HCT VFR BLD CALC: 32.3 % (ref 42–54)
HGB BLD-MCNC: 10.7 G/DL (ref 13.5–17.5)
IMM GRANULOCYTES NFR BLD: 0.3 % (ref 0–5)
LYMPHOCYTES NFR BLD AUTO: 27.5 % (ref 15–50)
MCH RBC QN AUTO: 31.9 PG (ref 26–34)
MCHC RBC AUTO-ENTMCNC: 33.1 G/DL (ref 31–37)
MCV RBC: 96.4 FL (ref 80–100)
MONOCYTES NFR BLD: 8.8 % (ref 2–11)
NEUTROPHILS NFR BLD AUTO: 49.7 % (ref 40–80)
OSMOLALITY SERPL CALC.SUM OF ELEC: 293 MOSM/KG (ref 275–300)
PLATELET # BLD: 73 10X3/UL (ref 130–400)
PMV BLD AUTO: 10.9 FL (ref 7.4–10.4)
POTASSIUM SERPL-SCNC: 5 MMOL/L (ref 3.5–5.1)
RBC # BLD AUTO: 3.35 10X6/UL (ref 4.2–6.1)
SODIUM SERPL-SCNC: 141 MMOL/L (ref 136–145)
WBC # BLD AUTO: 6.8 10X3/UL (ref 4.8–10.8)

## 2017-01-20 NOTE — NUR
REVIEWED DISCHARGE INSTRUCTIONS WITH PT AND WIFE BOTH STATE UDERSTANDING COPY
GIVEN DCD SALINE LOCK WITH 20 GA IV CATH INTACT SITE FREE OF REDNESS OR EDEMA
PT DISCHARGED HOME LEFT UNIT VIA W/C IN STABLE CONDITION WITH ALL PERSONAL
BELONGINGS

## 2017-01-23 NOTE — EC
PATIENT:KIRA UP                 DATE OF SERVICE: 01/13/17
SEX: M                                  MEDICAL RECORD: [id number]
DATE OF BIRTH: [birth year]                        LOCATION:D.      D.211
AGE OF PATIENT: 81                             ADMISSION DATE: 01/13/17
 
REFERRING PHYSICIAN:                               
 
INTERPRETING PHYSICIAN: OLAYINKA ESTRADA M.D.          
 
 
 
                             ECHOCARDIOGRAM REPORT
  ECHO CHARGES 4               ECHO COMPLETE            
 
 
 
CLINICAL DIAGNOSIS: CHF                           
 
                         ECHOCARDIOGRAPHIC MEASUREMENTS
      (adult normal given)
        AC root (d.<3.7cm) 3.6    LV Septum d (<1.2 cm> 1.5 
           Valve Excursion 1.5      LV Septum (systole) 2.2 
     Left Atria (s.<4.0cm> 4.6           LVPW d(<1.2cm) 1.3 
             RV (d.<2.3cm) 2.8            LVPW (sytole) 1.9 
       LV diastole(<5.6CM) 5.5        MV E-F(>70mm/sec)     
                LV systole 3.3            LVOT Diameter 2.1 
            MV exc.(>10mm)     
Est.ejection fraction (50-75%)     Pericardial Effusion N
 
   DOPPLER:
     LVIT       A 113.0 E 71.0
       LA      RVSP 51.0
     LVOT 100  AOP1/2T     
  Asc. Ao 223 
     RVOT 74.0
       RA     
        
 AV Gradient Peak 20.0  AV Mean 8.4   AV Area 1.9 
 MV Gradient Peak 5.2   MV Mean 2.1   MV Area     
   COMMENTS:                                              
 
 
 Cardiac Sonographer: Darwin PARKEROE            
      Cardiologist:Jaylene Estrada                
             TAPE# [id number]           
                                                                                     
 
 
DATE OF SERVICE:  01/15/2017
 
REFERRING PHYSICIAN:  Dr. Perez.
 
INDICATION:  CHF.
 
DESCRIPTION:  Left ventricle demonstrates left ventricular hypertrophy.  There
is mild to moderate LV dysfunction noted.  His ejection fraction is in the order
of 40%.  Mitral valve is structurally normal.  There is mild regurgitation seen.
 Left atrium is moderately dilated.  The aortic valve is a tissue valve.  There
 
 
 
ECHOCARDIOGRAM REPORT                          [id number]    KIRA UP         
 
 
is no stenosis or regurgitation seen.  Right ventricle is mildly dilated. 
Tricuspid valve is structurally normal.  There is mild regurgitation seen. 
Right ventricular systolic pressure is elevated at 51 mmHg.  There is no
pericardial effusion noted.
 
IMPRESSION:
1.  Left ventricular hypertrophy with moderate LV dysfunction with ejection
fraction of 40%.
2.  Mild mitral regurgitation.
3.  Mild tricuspid regurgitation with elevated pulmonary pressure.
4.  Normally functioning prosthetic aortic valve.
 
TRANSINT:[id number] Voice Confirmation ID: [id number] DOCUMENT ID: [id number]
                                           
                                           OLAYINKA ESTRADA M.D.          
 
 
 
Electronically Signed by OLAYINKA ESTRADA on 01/23/17 at 1545
 
 
 
 
 
 
 
 
 
 
 
 
 
 
 
 
 
 
 
 
 
 
 
 
 
 
 
CC:                                                             [id number]
DICTATION DATE: 01/15/17 1809     :     01/15/17 1928      DIS IN  
                                                                      01/20/17
[place] [id number] [place] [id number]

## 2017-01-23 NOTE — OP
PATIENT NAME:  KIRA UP                           MEDICAL RECORD: [id number]
:[birth year]                                             LOCATION:D.M2     D.2118
                                                         ADMISSION DATE:17
SURGEON:  OLAYINKA PATINO M.D.          
 
 
DATE OF OPERATION:  2017
 
REFERRING PHYSICIAN:  Dr. Choco Preez.
 
PROCEDURES PERFORMED:
1.  Selective coronary angiography.
2.  Left internal mammary injection.
3.  Bypass angiography.
4.  Aortic root injection
 
INDICATION:  An 81-year-old gentleman presents with symptoms of congestive heart
failure and recurrent angina.
 
EQUIPMENT USED:  A 5-Bangladeshi JL4, AR2, mammary catheter, pigtail catheter.
 
TECHNIQUE:  A 5-Bangladeshi sheath was inserted in retrograde fashion in the right
common femoral artery.  Next, selective coronary angiography was performed in
standard 5-Bangladeshi JL4 and AR modified catheters.  Bypass angiography was
performed using AR2 catheter.  The internal mammary was selected with internal
mammary catheter.  Finally, aortic root injection performed using a pigtail
catheter.
 
CORONARY ANATOMY:
1.  Left main:  Left main trunk is moderate in caliber.  It gives rise to the
LAD and circumflex.  It has no obstruction.
2.  LAD:  This vessel is 100% occluded.  There is competitive flow seen to this
area.
3.  Circumflex:  This vessel is moderate in caliber.  There is a 99% stenosis
proximally.  The vessel appears to be occluded after the first marginal branch. 
There is competitive flow to the first marginal branch.
4.  Right coronary artery:  This vessel is occluded just beyond the origin.
5.  Saphenous vein graft to PDA:  This graft is widely patent throughout its
course.  There is a stent placed beyond the anastomosis, appears widely patent.
6.  Saphenous vein graft to obtuse marginal branch:  This actually appears to be
a skip graft to the first diagonal branch as well as the lateral branch and
circumflex.  Both limbs are patent.
7.  Left internal mammary artery to the LAD:  This graft is widely patent
throughout its course.
8.  Ascending aorta:  The ascending aorta appears to be of normal caliber. 
There is a tissue valve in the aortic position.  I do not see any insufficiency.
 
IMPRESSION:
1.  Patent bypass grafts to the left anterior descending, circumflex and right
coronary artery.
2.  Normal left ventricular function, prosthetic aortic valve.
 
RECOMMENDATIONS:  At this point, we will continue with medical management.
 
TRANSINT:[id number] Voice Confirmation ID: [id number] DOCUMENT ID: [id number]
 
 
 
OPERATIVE REPORT                               [id number]    KIRA UP,OLAYINKA BAH M.D.          
 
 
 
Electronically Signed by OLAYINKA PATINO on 17 at 1545
 
 
 
 
 
 
 
 
 
 
 
 
 
 
 
 
 
 
 
 
 
 
 
 
 
 
 
 
 
 
 
 
 
 
 
 
 
 
 
 
 
CC:                                                             [id number]
DICTATION DATE: 17 0936     :     17 1026      DIS IN  
                                                                      17
[facility]                                          
[address] [id number]

## 2017-03-13 ENCOUNTER — HOSPITAL ENCOUNTER (OUTPATIENT)
Dept: HOSPITAL 84 - D.RAD | Age: 82
Discharge: HOME | End: 2017-03-13
Attending: INTERNAL MEDICINE
Payer: MEDICARE

## 2017-03-13 VITALS — BODY MASS INDEX: 26.8 KG/M2

## 2017-03-13 DIAGNOSIS — J84.10: Primary | ICD-10-CM

## 2017-03-13 LAB
ALBUMIN SERPL-MCNC: 3.1 G/DL (ref 3.4–5)
ALP SERPL-CCNC: 92 U/L (ref 46–116)
ALT SERPL-CCNC: 18 U/L (ref 10–68)
BILIRUB DIRECT SERPL-MCNC: 0.11 MG/DL (ref 0–0.3)
BILIRUB INDIRECT SERPL-MCNC: 0.34 MG/DL (ref 0–1)
BILIRUB SERPL-MCNC: 0.45 MG/DL (ref 0.2–1.3)
GLOBULIN SER-MCNC: 3.7 G/L
PROT SERPL-MCNC: 6.8 G/DL (ref 6.4–8.2)

## 2017-04-12 ENCOUNTER — HOSPITAL ENCOUNTER (INPATIENT)
Dept: HOSPITAL 84 - D.ER | Age: 82
LOS: 8 days | Discharge: NURSING, REHAB OR LONG-TERM CARE | DRG: 189 | End: 2017-04-20
Attending: FAMILY MEDICINE | Admitting: FAMILY MEDICINE
Payer: MEDICARE

## 2017-04-12 VITALS
HEIGHT: 71 IN | BODY MASS INDEX: 22.37 KG/M2 | WEIGHT: 159.8 LBS | BODY MASS INDEX: 22.37 KG/M2 | WEIGHT: 159.8 LBS | HEIGHT: 71 IN | BODY MASS INDEX: 22.37 KG/M2

## 2017-04-12 VITALS — DIASTOLIC BLOOD PRESSURE: 56 MMHG | SYSTOLIC BLOOD PRESSURE: 103 MMHG

## 2017-04-12 DIAGNOSIS — D61.818: ICD-10-CM

## 2017-04-12 DIAGNOSIS — J44.9: ICD-10-CM

## 2017-04-12 DIAGNOSIS — I27.2: ICD-10-CM

## 2017-04-12 DIAGNOSIS — D63.1: ICD-10-CM

## 2017-04-12 DIAGNOSIS — I50.22: ICD-10-CM

## 2017-04-12 DIAGNOSIS — J84.10: ICD-10-CM

## 2017-04-12 DIAGNOSIS — N18.4: ICD-10-CM

## 2017-04-12 DIAGNOSIS — I25.10: ICD-10-CM

## 2017-04-12 DIAGNOSIS — E53.1: ICD-10-CM

## 2017-04-12 DIAGNOSIS — N17.9: ICD-10-CM

## 2017-04-12 DIAGNOSIS — J96.20: Primary | ICD-10-CM

## 2017-04-12 DIAGNOSIS — Z95.1: ICD-10-CM

## 2017-04-12 DIAGNOSIS — I13.0: ICD-10-CM

## 2017-04-12 DIAGNOSIS — J45.909: ICD-10-CM

## 2017-04-12 LAB
ALBUMIN SERPL-MCNC: 2.1 G/DL (ref 3.4–5)
ALP SERPL-CCNC: 101 U/L (ref 46–116)
ALT SERPL-CCNC: 26 U/L (ref 10–68)
ANION GAP SERPL CALC-SCNC: 13.1 MMOL/L (ref 8–16)
BASOPHILS NFR BLD AUTO: 0.4 % (ref 0–2)
BILIRUB SERPL-MCNC: 0.95 MG/DL (ref 0.2–1.3)
BUN SERPL-MCNC: 58 MG/DL (ref 7–18)
CALCIUM SERPL-MCNC: 7.8 MG/DL (ref 8.5–10.1)
CHLORIDE SERPL-SCNC: 101 MMOL/L (ref 98–107)
CO2 SERPL-SCNC: 28.6 MMOL/L (ref 21–32)
CREAT SERPL-MCNC: 3.5 MG/DL (ref 0.6–1.3)
EOSINOPHIL NFR BLD: 1.1 % (ref 0–7)
ERYTHROCYTE [DISTWIDTH] IN BLOOD BY AUTOMATED COUNT: 14.9 % (ref 11.5–14.5)
GLOBULIN SER-MCNC: 3.9 G/L
GLUCOSE SERPL-MCNC: 132 MG/DL (ref 74–106)
HCT VFR BLD CALC: 31 % (ref 42–54)
HGB BLD-MCNC: 10.5 G/DL (ref 13.5–17.5)
IMM GRANULOCYTES NFR BLD: 0.6 % (ref 0–5)
LYMPHOCYTES NFR BLD AUTO: 11.5 % (ref 15–50)
MCH RBC QN AUTO: 32.2 PG (ref 26–34)
MCHC RBC AUTO-ENTMCNC: 33.9 G/DL (ref 31–37)
MCV RBC: 95.1 FL (ref 80–100)
MONOCYTES NFR BLD: 12.5 % (ref 2–11)
NEUTROPHILS NFR BLD AUTO: 73.9 % (ref 40–80)
OSMOLALITY SERPL CALC.SUM OF ELEC: 295 MOSM/KG (ref 275–300)
PLATELET # BLD EST: (no result) 10*3/UL
PLATELET # BLD: 67 10X3/UL (ref 130–400)
PMV BLD AUTO: 10.4 FL (ref 7.4–10.4)
POTASSIUM SERPL-SCNC: 3.7 MMOL/L (ref 3.5–5.1)
PROT SERPL-MCNC: 6 G/DL (ref 6.4–8.2)
RBC # BLD AUTO: 3.26 10X6/UL (ref 4.2–6.1)
SODIUM SERPL-SCNC: 139 MMOL/L (ref 136–145)
WBC # BLD AUTO: 4.7 10X3/UL (ref 4.8–10.8)

## 2017-04-12 NOTE — NUR
PT ARRIVED ON UNIT AT 2245 VIA STRETCHER ESCORTED BY ER NURSE.  POSITIONED IN
BED FOR COMFORT AND ORIENTED TO ROOM AND CALL LIGHT.  ADMISSION ASSESSMENT AND
HISTORY COMPLETE.  HOME MEDICATION RECONCILLED.
GAVE SHACK OF SANDWICH, APPLESAUCE, AND COFFEE.
WILL MONITOR CLOSELY FOR NEEDS.

## 2017-04-13 VITALS — SYSTOLIC BLOOD PRESSURE: 98 MMHG | DIASTOLIC BLOOD PRESSURE: 62 MMHG

## 2017-04-13 VITALS — SYSTOLIC BLOOD PRESSURE: 107 MMHG | DIASTOLIC BLOOD PRESSURE: 59 MMHG

## 2017-04-13 VITALS — DIASTOLIC BLOOD PRESSURE: 56 MMHG | SYSTOLIC BLOOD PRESSURE: 103 MMHG

## 2017-04-13 VITALS — SYSTOLIC BLOOD PRESSURE: 106 MMHG | DIASTOLIC BLOOD PRESSURE: 65 MMHG

## 2017-04-13 VITALS — DIASTOLIC BLOOD PRESSURE: 56 MMHG | SYSTOLIC BLOOD PRESSURE: 97 MMHG

## 2017-04-13 LAB
ANION GAP SERPL CALC-SCNC: 12.1 MMOL/L (ref 8–16)
APPEARANCE UR: CLEAR
BACTERIA #/AREA URNS HPF: (no result) /HPF
BASOPHILS NFR BLD AUTO: 0.4 % (ref 0–2)
BILIRUB SERPL-MCNC: NEGATIVE MG/DL
BUN SERPL-MCNC: 63 MG/DL (ref 7–18)
CALCIUM SERPL-MCNC: 8 MG/DL (ref 8.5–10.1)
CHLORIDE SERPL-SCNC: 101 MMOL/L (ref 98–107)
CO2 SERPL-SCNC: 29.4 MMOL/L (ref 21–32)
COLOR UR: YELLOW
CREAT SERPL-MCNC: 3.5 MG/DL (ref 0.6–1.3)
EOSINOPHIL NFR BLD: 0 % (ref 0–7)
ERYTHROCYTE [DISTWIDTH] IN BLOOD BY AUTOMATED COUNT: 15.1 % (ref 11.5–14.5)
GLUCOSE SERPL-MCNC: 203 MG/DL (ref 74–106)
GLUCOSE SERPL-MCNC: NEGATIVE MG/DL
GRAN CASTS #/AREA URNS LPF: (no result) /LPF
HCT VFR BLD CALC: 30.1 % (ref 42–54)
HGB BLD-MCNC: 10.1 G/DL (ref 13.5–17.5)
HYALINE CASTS #/AREA URNS LPF: (no result) /LPF
IMM GRANULOCYTES NFR BLD: 1.2 % (ref 0–5)
KETONES UR STRIP-MCNC: NEGATIVE MG/DL
LEUKOCYTE ESTERASE: NEGATIVE
LYMPHOCYTES NFR BLD AUTO: 8.1 % (ref 15–50)
MCH RBC QN AUTO: 32.1 PG (ref 26–34)
MCHC RBC AUTO-ENTMCNC: 33.6 G/DL (ref 31–37)
MCV RBC: 95.6 FL (ref 80–100)
MONOCYTES NFR BLD: 4.6 % (ref 2–11)
MUCOUS THREADS #/AREA URNS LPF: (no result) /LPF
NEUTROPHILS NFR BLD AUTO: 85.7 % (ref 40–80)
NITRITE UR-MCNC: NEGATIVE MG/ML
OSMOLALITY SERPL CALC.SUM OF ELEC: 299 MOSM/KG (ref 275–300)
PH UR STRIP: 7 [PH] (ref 5–6)
PLATELET # BLD: 66 10X3/UL (ref 130–400)
PMV BLD AUTO: 10.5 FL (ref 7.4–10.4)
POTASSIUM SERPL-SCNC: 4.5 MMOL/L (ref 3.5–5.1)
PROT UR-MCNC: (no result) MG/DL
RBC # BLD AUTO: 3.15 10X6/UL (ref 4.2–6.1)
RBC #/AREA URNS HPF: (no result) /HPF (ref 0–5)
SODIUM SERPL-SCNC: 138 MMOL/L (ref 136–145)
SP GR UR STRIP: 1.01 (ref 1–1.02)
SQUAMOUS #/AREA URNS HPF: (no result) /HPF (ref 0–5)
UROBILINOGEN UR-MCNC: 1 MG/DL
WBC # BLD AUTO: 5 10X3/UL (ref 4.8–10.8)
WBC #/AREA URNS HPF: (no result) /HPF (ref 0–5)

## 2017-04-13 NOTE — NUR
PATIENT RESTING IN BED. NO SIGNS OF DISTRESS NOTED. SCHEDULED MEDS GIVEN.
SHIFT ASSESSMENT COMPLETED. DENIES ANY NEEDS AT THIS TIME. BED LOW. CALL LIGHT
IN REACH

## 2017-04-13 NOTE — NUR
PT AOX4 RESP SOB RESP EVEN OXYGEN SAT 97 ON 4L. PT DENIES NEEDS AT THIS TIME
BED AT LOWEST SETTING IV TO LEFT FOREARM PATENT AND INTACT CALL LIGHT WITHIN
REACH WILL CONTINUE TO MONITOR

## 2017-04-13 NOTE — NUR
PATIENT RESTING IN BED. NO SIGNS OF DISTRESS NOTED. DENIES ANY PAIN AT THIS
TIME. SCHEDULED MED GIVEN. SHIFT ASSESSMENT COMPLETED. DENIES ANY NEEDS AT
THIS TIME. BED LOW. CALL LIGHT IN REACH

## 2017-04-13 NOTE — NUR
Patient Name: KIRA UP                                   Admission
Status: ER
Accout number: [id number]                            Admission Date:
2017
: 1935                                                      Admission
Diagnosis:
Attending: DAMION                                              Current
LOS:  1
 
Anticipated DC Date:
2017
Planned Disposition: Home
Primary Insurance: [employer] [facility]
 
 
Discharge Planning Comments:
CM MET WITH PATIENT AND WIFE (ROSEMARY) REGARDING D/C NEEDS AND PLANS. PATIENT
STATED HE LIVES WITH HIS WIFE AND SHE WILL DRIVE HIM HOME AT DISCHARGE.
PATIENT HAS 4 STEPS W/RAILS TO ENTER HOME AND NO STAIRS INSIDE. PATIENTS PCP
IS DR. MOHAMUD AND PHARMACY IS [facility] [address]. PATIENT IS
INDEPENDENT WITH HIS CARE AND HAS A WALKER AT HOME. PATIENT HAS OXYGEN AT HOME
BUT JUST RESENDED ORDER FEW DAYS AGO WITH [facility]. PATIENT HAS NEVER HAD HOME
HEALTH AND DOES NOT THINK HE NEEDS IT AT DISCHARGE. CM WILL CONTINUE TO FOLLOW
PATIENT WITH DISCHARGE NEEDS AND PLANS.
 
 
PCP  DR. ONEIDA MORGAN [facility] [address]   [phone]
GHAZAL (WIFE)  [phone]
 
 
 
 
 
: Sun Khan
 
 
Is the patient Alert and Oriented? Yes  0
* How many steps to enter\exit or inside your home? 4 W/RAILS  0
* PCP DR. MOHAMUD  0
* Pharmacy [facility] [address]  0
* Preadmission Environment Home with Family  0
* ADLs Independent  0
* Equipment Walker  0
* Other Equipment PATIENT HAS OXYGEN BUT HAD JUST RESENDED ORDER BUT IT HAS
NOT BEEN PICKED UP AT THIS TIME.  0
* List name and contact numbers for known caregivers / representatives who
currently or will assist patient after discharge: GHAZAL  (WIFE)  [phone]
0
* Community resources currently utilized None  0
* Additional services required to return to the preadmission environment? Yes
0
* Can the patient safely return to the preadmission environment? Yes  0
* Has this patient been hospitalized within the prior 30 days at any hospital?
No  0
    Grand Total:  0

## 2017-04-14 VITALS — SYSTOLIC BLOOD PRESSURE: 106 MMHG | DIASTOLIC BLOOD PRESSURE: 58 MMHG

## 2017-04-14 VITALS — DIASTOLIC BLOOD PRESSURE: 71 MMHG | SYSTOLIC BLOOD PRESSURE: 124 MMHG

## 2017-04-14 VITALS — DIASTOLIC BLOOD PRESSURE: 45 MMHG | SYSTOLIC BLOOD PRESSURE: 95 MMHG

## 2017-04-14 VITALS — SYSTOLIC BLOOD PRESSURE: 126 MMHG | DIASTOLIC BLOOD PRESSURE: 70 MMHG

## 2017-04-14 VITALS — DIASTOLIC BLOOD PRESSURE: 61 MMHG | SYSTOLIC BLOOD PRESSURE: 108 MMHG

## 2017-04-14 VITALS — SYSTOLIC BLOOD PRESSURE: 124 MMHG | DIASTOLIC BLOOD PRESSURE: 73 MMHG

## 2017-04-14 LAB
ANION GAP SERPL CALC-SCNC: 14.9 MMOL/L (ref 8–16)
BASOPHILS NFR BLD AUTO: 0.2 % (ref 0–2)
BUN SERPL-MCNC: 77 MG/DL (ref 7–18)
CALCIUM SERPL-MCNC: 7.9 MG/DL (ref 8.5–10.1)
CHLORIDE SERPL-SCNC: 104 MMOL/L (ref 98–107)
CO2 SERPL-SCNC: 25.2 MMOL/L (ref 21–32)
CREAT SERPL-MCNC: 3.3 MG/DL (ref 0.6–1.3)
EOSINOPHIL NFR BLD: 0 % (ref 0–7)
ERYTHROCYTE [DISTWIDTH] IN BLOOD BY AUTOMATED COUNT: 15.2 % (ref 11.5–14.5)
FERRITIN SERPL-MCNC: 1385 NG/ML (ref 3–244)
GLUCOSE SERPL-MCNC: 134 MG/DL (ref 74–106)
HCT VFR BLD CALC: 27.4 % (ref 42–54)
HGB BLD-MCNC: 9.4 G/DL (ref 13.5–17.5)
IMM GRANULOCYTES NFR BLD: 0.5 % (ref 0–5)
LYMPHOCYTES NFR BLD AUTO: 9.7 % (ref 15–50)
MAGNESIUM SERPL-MCNC: 1.9 MG/DL (ref 1.8–2.4)
MCH RBC QN AUTO: 32.5 PG (ref 26–34)
MCHC RBC AUTO-ENTMCNC: 34.3 G/DL (ref 31–37)
MCV RBC: 94.8 FL (ref 80–100)
MONOCYTES NFR BLD: 8.3 % (ref 2–11)
NEUTROPHILS NFR BLD AUTO: 81.3 % (ref 40–80)
OSMOLALITY SERPL CALC.SUM OF ELEC: 303 MOSM/KG (ref 275–300)
PHOSPHATE SERPL-MCNC: 4.9 MG/DL (ref 2.5–4.9)
PLATELET # BLD: 85 10X3/UL (ref 130–400)
PMV BLD AUTO: 11.6 FL (ref 7.4–10.4)
POTASSIUM SERPL-SCNC: 4.1 MMOL/L (ref 3.5–5.1)
RBC # BLD AUTO: 2.89 10X6/UL (ref 4.2–6.1)
SODIUM SERPL-SCNC: 140 MMOL/L (ref 136–145)
WBC # BLD AUTO: 5.8 10X3/UL (ref 4.8–10.8)

## 2017-04-14 NOTE — NUR
NO CHANGES IN INITIAL ASSESSMENT. CALL LIGHT IN REACH. BED ALARM ON. SCDs TO
BLE. WILL CONTINUE WITH PLAN OF CARE.

## 2017-04-14 NOTE — NUR
RESTING QUIETLY IN BED. WIFE AT BEDSIDE. DENIES NEEDS AT THIS TIME. LUNGS ARE
CLEAR THROUGHOUT BUT DIMINISHED. NO COUGH NOTED. DENIES PAIN.

## 2017-04-14 NOTE — NUR
ASSESSMENT COMPLETED. OFFERED SCDs BUT REFUSED EVEN AFTER EXPLAINING
IMPORTANCE. CALL LIGHT IN REACH. WILL CONTINUE WITH PLAN OF CARE.

## 2017-04-14 NOTE — HP
PATIENT: KIRA UP                                 MEDICAL RECORD: [id number]
ACCOUNT: [id number]                                    LOCATION:D.MS [id number]
: [birth year]                                            ADMISSION DATE: 17
                                                         
 
                             HISTORY AND PHYSICAL EXAMINATION
 
 
Admission History and Physical
 
An 81-year-old  male.
 
HISTORY OF PRESENT ILLNESS:  The patient presents to the Emergency Room after 5
days of progressive shortness of breath, weakness, fever and chills at home. 
Denies nausea or vomiting.
 
PAST MEDICAL HISTORY:  Significant for pulmonary fibrosis, pulmonary
hypertension, bypass surgery and valve replacement surgery, and heart
catheterization in January, no significant changes, chronic kidney disease, and
COPD.
 
SOCIAL HISTORY:  , works at the [employer].
 
ALLERGIES:  No known drug allergies.
 
CURRENT MEDICATIONS:  Combivent, Respimat inhaler, DuoNebs, Symbicort, Mucinex,
and Ofev 150 mg b.i.d.
 
REVIEW OF SYSTEMS:
GENERAL:  Decreased appetite with recent illness.
HEENT:  No cephalgia, visual changes, tinnitus, epistaxis, or dysphagia.
CARDIOVASCULAR:  Denies chest pain or palpitation.  Admits shortness of breath
with exertion related to his pulmonary function.
PULMONARY:  Denies hemoptysis.  Denies night sweats.  Admits to nonproductive
cough, fever and chills and progressive shortness of breath.
GASTROINTESTINAL:  Denies hematemesis, hematochezia, or melena.
GENITOURINARY:  Denies dysuria.
MUSCULOSKELETAL:  No acute changes other than increased weakness with acute
illness.
 
PHYSICAL EXAMINATION:
VITAL SIGNS:  Temperature 98.4, blood pressure is 98/62, heart rate 70,
respirations 18, and O2 sats 98% on 2 liters via nasal cannula.
HEENT:  Head is normocephalic and atraumatic.  Eyes:  Pupils are equally round
and reactive to light and accommodation.  Extraocular muscles intact. 
Conjunctiva was not injected.  Ears:  Canals patent.  TMs are intact.  Nose: 
Nares patent without drainage.  Throat:  No erythema, no exudates.
NECK:  Supple.  No lymphadenopathy.
HEART:  Regular rate and rhythm.  No S3, S4, no rub.
LUNGS:  Coarse rhonchi at the bases, left greater than right.
ABDOMEN:  Soft and nontender.  Bowel sounds all 4 quadrants.
EXTREMITIES:  Present times 4.  No edema.
NEUROLOGIC:  No focal deficits.
SKIN:  Warm, dry.  No rash.
 
LABORATORY DATA:  CBC:  White count 5000, hemoglobin 10.1, hematocrit 30.1,
platelets 66, and neutrophils 85.7.  Chemistry shows a sodium of 138, potassium
4.5, chloride 101, bicarbonate 29.4, BUN 63, creatinine 3.5, GFR 22, and glucose
 
 
 
HISTORY AND PHYSICAL                           [id number]    KIRA UP J         
 
 
203.  Calcium is 8.  Urinalysis yellow, clear, 1+ blood, negative for nitrites,
few bacteria.
 
DIAGNOSTIC DATA:  Chest x-ray:  Lungs are slightly hypoinflated, increased
interstitial markings, particularly within the lung bases.  No pleural effusion.
 Concern for evolving infiltrate.
 
ASSESSMENT AND PLAN:  Pneumonia, pancytopenia, chronic, acute-on-chronic renal
insufficiency and generalized weakness.  The patient is admitted.  DuoNebs, IV
antibiotics, cautious hydration with his renal insufficiency, monitor, consult
hematology for the pancytopenia.  Supportive care.  Monitor renal function. 
Accurate I's and O's.
 
TRANSINT:[id number] Voice Confirmation ID: [id number] DOCUMENT ID: [id number]
 
 
                                           
                                           EUSEBIA MOHAMUD DO            
 
 
 
Electronically Signed by EUSEBIA HOWE on 17 at 0752
 
 
 
 
 
 
 
 
 
 
 
 
 
 
 
 
 
 
 
 
 
 
 
 
 
CC:                                                             [id number]
DICTATION DATE: 17 0808     :     17 0909      ADM IN  
                                                                              
[facility]                                          
[address] [place] [address]

## 2017-04-15 VITALS — SYSTOLIC BLOOD PRESSURE: 98 MMHG | DIASTOLIC BLOOD PRESSURE: 51 MMHG

## 2017-04-15 VITALS — DIASTOLIC BLOOD PRESSURE: 73 MMHG | SYSTOLIC BLOOD PRESSURE: 111 MMHG

## 2017-04-15 VITALS — SYSTOLIC BLOOD PRESSURE: 122 MMHG | DIASTOLIC BLOOD PRESSURE: 75 MMHG

## 2017-04-15 VITALS — SYSTOLIC BLOOD PRESSURE: 117 MMHG | DIASTOLIC BLOOD PRESSURE: 61 MMHG

## 2017-04-15 VITALS — SYSTOLIC BLOOD PRESSURE: 118 MMHG | DIASTOLIC BLOOD PRESSURE: 72 MMHG

## 2017-04-15 VITALS — DIASTOLIC BLOOD PRESSURE: 75 MMHG | SYSTOLIC BLOOD PRESSURE: 144 MMHG

## 2017-04-15 LAB
ALBUMIN SERPL-MCNC: 2.1 G/DL (ref 3.4–5)
ALP SERPL-CCNC: 147 U/L (ref 46–116)
ALT SERPL-CCNC: 62 U/L (ref 10–68)
ANION GAP SERPL CALC-SCNC: 14.9 MMOL/L (ref 8–16)
BASOPHILS NFR BLD AUTO: 0.3 % (ref 0–2)
BILIRUB SERPL-MCNC: 0.24 MG/DL (ref 0.2–1.3)
BUN SERPL-MCNC: 82 MG/DL (ref 7–18)
CALCIUM SERPL-MCNC: 7.9 MG/DL (ref 8.5–10.1)
CHLORIDE SERPL-SCNC: 106 MMOL/L (ref 98–107)
CO2 SERPL-SCNC: 23.3 MMOL/L (ref 21–32)
CREAT SERPL-MCNC: 3 MG/DL (ref 0.6–1.3)
EOSINOPHIL NFR BLD: 3.8 % (ref 0–7)
ERYTHROCYTE [DISTWIDTH] IN BLOOD BY AUTOMATED COUNT: 15.1 % (ref 11.5–14.5)
FOLATE SERPL-MCNC: 8.3 NG/ML (ref 3–?)
GLOBULIN SER-MCNC: 3.6 G/L
GLUCOSE SERPL-MCNC: 105 MG/DL (ref 74–106)
HCT VFR BLD CALC: 29.1 % (ref 42–54)
HGB BLD-MCNC: 9.8 G/DL (ref 13.5–17.5)
IMM GRANULOCYTES NFR BLD: 1 % (ref 0–5)
LYMPHOCYTES NFR BLD AUTO: 21.5 % (ref 15–50)
MAGNESIUM SERPL-MCNC: 2 MG/DL (ref 1.8–2.4)
MCH RBC QN AUTO: 32 PG (ref 26–34)
MCHC RBC AUTO-ENTMCNC: 33.7 G/DL (ref 31–37)
MCV RBC: 95.1 FL (ref 80–100)
MONOCYTES NFR BLD: 9.4 % (ref 2–11)
NEUTROPHILS NFR BLD AUTO: 64 % (ref 40–80)
OSMOLALITY SERPL CALC.SUM OF ELEC: 303 MOSM/KG (ref 275–300)
PHOSPHATE SERPL-MCNC: 3.3 MG/DL (ref 2.5–4.9)
PLATELET # BLD: 89 10X3/UL (ref 130–400)
PMV BLD AUTO: 11.3 FL (ref 7.4–10.4)
POTASSIUM SERPL-SCNC: 4.2 MMOL/L (ref 3.5–5.1)
PROT SERPL-MCNC: 5.7 G/DL (ref 6.4–8.2)
RBC # BLD AUTO: 3.06 10X6/UL (ref 4.2–6.1)
SODIUM SERPL-SCNC: 140 MMOL/L (ref 136–145)
VIT B12 SERPL-MCNC: >2000 PG/ML (ref 211–946)
WBC # BLD AUTO: 6.3 10X3/UL (ref 4.8–10.8)

## 2017-04-15 NOTE — NUR
AWAKE AND ALERT. ORIENTED X3. NO C/O AT THIS TIME. REPORTS PASSING GAS FOR
NOW. LUNGS HAVE CRACKLES THROUGHOUT LUNG GARCIA MORE SO ON THE LEFT, REPORTS
INCREASED COUGH NONPRODUCTIVE. SKIN IS INTACT WITHOUT REDNESS. IV TO LEFT
FOREARM IS PATENT WITHOUT REDNESS AT INSERTION SITE. DENIES NEEDS.  SCD'S IN
PLACE.

## 2017-04-15 NOTE — NUR
ATE MOST OF SUPPER SITTING UP IN CHAIR AT BEDSIDE. NO C/O AT THIS TIME. DENIES
NEEDS. NO CHANGES NOTED.

## 2017-04-15 NOTE — NUR
ASESSED AT THE BEGINNING OF THE SHIFT. PT IS ALWER AND ORIENTED, ABLE TO
VERBALIZE NEEDS. WE ARE ASSISTING HIM UP TO THE BATHROOM TO VOID AND HE ALSO
KNOWS THAT WE NEED A STOOL SAMPLE. HE IS JUST A LITTLE WEAK AND WE DONT WANT
HIM TO FALL. TELEMETRY IS IN PLACE BUT SCD'S HAVE BEEN REFUSED. O2 IS AT 2
LITERS AND HIS RESPIRATIONS ARE EASY. THE BED IS LOW, RAILS UP X'S 2 WITH THE
CALL LIGHT AT HAND.

## 2017-04-16 VITALS — SYSTOLIC BLOOD PRESSURE: 111 MMHG | DIASTOLIC BLOOD PRESSURE: 70 MMHG

## 2017-04-16 VITALS — SYSTOLIC BLOOD PRESSURE: 123 MMHG | DIASTOLIC BLOOD PRESSURE: 61 MMHG

## 2017-04-16 VITALS — SYSTOLIC BLOOD PRESSURE: 129 MMHG | DIASTOLIC BLOOD PRESSURE: 74 MMHG

## 2017-04-16 VITALS — SYSTOLIC BLOOD PRESSURE: 121 MMHG | DIASTOLIC BLOOD PRESSURE: 65 MMHG

## 2017-04-16 VITALS — DIASTOLIC BLOOD PRESSURE: 60 MMHG | SYSTOLIC BLOOD PRESSURE: 106 MMHG

## 2017-04-16 VITALS — DIASTOLIC BLOOD PRESSURE: 65 MMHG | SYSTOLIC BLOOD PRESSURE: 123 MMHG

## 2017-04-16 LAB
ANION GAP SERPL CALC-SCNC: 14.4 MMOL/L (ref 8–16)
APTT BLD: 33.6 SECONDS (ref 22.8–39.4)
BASOPHILS NFR BLD AUTO: 0.3 % (ref 0–2)
BUN SERPL-MCNC: 73 MG/DL (ref 7–18)
CALCIUM SERPL-MCNC: 7.4 MG/DL (ref 8.5–10.1)
CHLORIDE SERPL-SCNC: 108 MMOL/L (ref 98–107)
CO2 SERPL-SCNC: 23.6 MMOL/L (ref 21–32)
CREAT SERPL-MCNC: 2.6 MG/DL (ref 0.6–1.3)
EOSINOPHIL NFR BLD: 7.4 % (ref 0–7)
ERYTHROCYTE [DISTWIDTH] IN BLOOD BY AUTOMATED COUNT: 15.5 % (ref 11.5–14.5)
FIBRINOGEN PPP-MCNC: 410 MG/DL (ref 239–481)
GLUCOSE SERPL-MCNC: 103 MG/DL (ref 74–106)
HCT VFR BLD CALC: 27.2 % (ref 42–54)
HGB BLD-MCNC: 9.3 G/DL (ref 13.5–17.5)
IMM GRANULOCYTES NFR BLD: 2.1 % (ref 0–5)
INR PPP: 1.2 (ref 0.85–1.17)
LDH1 SERPL-CCNC: 208 U/L (ref 85–227)
LYMPHOCYTES NFR BLD AUTO: 18.5 % (ref 15–50)
MCH RBC QN AUTO: 32.3 PG (ref 26–34)
MCHC RBC AUTO-ENTMCNC: 34.2 G/DL (ref 31–37)
MCV RBC: 94.4 FL (ref 80–100)
MONOCYTES NFR BLD: 6.6 % (ref 2–11)
NEUTROPHILS NFR BLD AUTO: 65.1 % (ref 40–80)
OSMOLALITY SERPL CALC.SUM OF ELEC: 304 MOSM/KG (ref 275–300)
PLATELET # BLD: 87 10X3/UL (ref 130–400)
PMV BLD AUTO: 10.5 FL (ref 7.4–10.4)
POTASSIUM SERPL-SCNC: 4 MMOL/L (ref 3.5–5.1)
PROTHROMBIN TIME: 15.1 SECONDS (ref 11.6–15)
RBC # BLD AUTO: 2.88 10X6/UL (ref 4.2–6.1)
SODIUM SERPL-SCNC: 142 MMOL/L (ref 136–145)
WBC # BLD AUTO: 6.3 10X3/UL (ref 4.8–10.8)

## 2017-04-16 NOTE — NUR
REC'D IN BED AWAKE AND ALERT. RESP EVEN AND UNLABORED WITH NO DISTRESS NOTED.
NO C/O PAIN OR DISCOMFORT NOTED. ASSESSMENT COMPLETED. C/L IN REACH AT
BEDSIDE.

## 2017-04-16 NOTE — NUR
AWAKE AND ALERT. ORIENTED X3. INCONTINENT LARGE AMOUNT OF URINE. SKIN CARE PER
STAFF. LINENS CHANGED. LUNGS ARE CLEAR BUT DIMINISHED AT THIS TIME. NO COUGH
NOTED. SKIN IS INTACT WITHOUT REDNESS. IV TO LEFT FOREARM IS PATENT WITHOUT
REDNESS AT INSERTION SITE. DENIES NEEDS.

## 2017-04-16 NOTE — NUR
PATIENT RESTING WITH EYES CLOSED AND NO VISIBLE SIGNS OF DISTRESS. BED IN
LOWEST POSITION AND CALL LIGHT WITHIN REACH.

## 2017-04-17 VITALS — SYSTOLIC BLOOD PRESSURE: 128 MMHG | DIASTOLIC BLOOD PRESSURE: 66 MMHG

## 2017-04-17 VITALS — SYSTOLIC BLOOD PRESSURE: 122 MMHG | DIASTOLIC BLOOD PRESSURE: 66 MMHG

## 2017-04-17 VITALS — DIASTOLIC BLOOD PRESSURE: 64 MMHG | SYSTOLIC BLOOD PRESSURE: 127 MMHG

## 2017-04-17 VITALS — SYSTOLIC BLOOD PRESSURE: 129 MMHG | DIASTOLIC BLOOD PRESSURE: 78 MMHG

## 2017-04-17 VITALS — DIASTOLIC BLOOD PRESSURE: 66 MMHG | SYSTOLIC BLOOD PRESSURE: 129 MMHG

## 2017-04-17 LAB
ALBUMIN SERPL ELPH-MCNC: 2.2 G/DL (ref 2.9–4.4)
ALBUMIN/GLOB SERPL: 0.8 {RATIO} (ref 0.7–1.7)
ALPHA1 GLOB SERPL ELPH-MCNC: 0.5 G/DL (ref 0–0.4)
ALPHA2 GLOB SERPL ELPH-MCNC: 0.8 G/DL (ref 0.4–1)
ANION GAP SERPL CALC-SCNC: 17.4 MMOL/L (ref 8–16)
B-GLOBULIN SERPL ELPH-MCNC: 0.7 G/DL (ref 0.7–1.3)
BASOPHILS NFR BLD AUTO: 0 % (ref 0–2)
BUN SERPL-MCNC: 67 MG/DL (ref 7–18)
CALCIUM SERPL-MCNC: 7.6 MG/DL (ref 8.5–10.1)
CHLORIDE SERPL-SCNC: 107 MMOL/L (ref 98–107)
CO2 SERPL-SCNC: 20.2 MMOL/L (ref 21–32)
CREAT SERPL-MCNC: 2.5 MG/DL (ref 0.6–1.3)
EOSINOPHIL NFR BLD: 0 % (ref 0–7)
ERYTHROCYTE [DISTWIDTH] IN BLOOD BY AUTOMATED COUNT: 15.2 % (ref 11.5–14.5)
GAMMA GLOB SERPL ELPH-MCNC: 0.7 G/DL (ref 0.4–1.8)
GLUCOSE SERPL-MCNC: 156 MG/DL (ref 74–106)
HCT VFR BLD CALC: 27.6 % (ref 42–54)
HGB BLD-MCNC: 9.7 G/DL (ref 13.5–17.5)
IGA SERPL-MCNC: 301 MG/DL (ref 61–437)
IGG SERPL-MCNC: 681 MG/DL (ref 700–1600)
IGM SERPL-MCNC: 54 MG/DL (ref 15–143)
IMM GRANULOCYTES NFR BLD: 3 % (ref 0–5)
LYMPHOCYTES NFR BLD AUTO: 9.7 % (ref 15–50)
MCH RBC QN AUTO: 32.9 PG (ref 26–34)
MCHC RBC AUTO-ENTMCNC: 35.1 G/DL (ref 31–37)
MCV RBC: 93.6 FL (ref 80–100)
MONOCYTES NFR BLD: 2.3 % (ref 2–11)
NEUTROPHILS NFR BLD AUTO: 85 % (ref 40–80)
OSMOLALITY SERPL CALC.SUM OF ELEC: 300 MOSM/KG (ref 275–300)
PLATELET # BLD: 110 10X3/UL (ref 130–400)
PMV BLD AUTO: 11.1 FL (ref 7.4–10.4)
POTASSIUM SERPL-SCNC: 4.6 MMOL/L (ref 3.5–5.1)
PROT SERPL-MCNC: 4.9 G/DL (ref 6–8.5)
RBC # BLD AUTO: 2.95 10X6/UL (ref 4.2–6.1)
SODIUM SERPL-SCNC: 140 MMOL/L (ref 136–145)
WBC # BLD AUTO: 4.7 10X3/UL (ref 4.8–10.8)

## 2017-04-17 NOTE — NUR
PT AOX4 RESP EVEN AND NONLABORED PT DENIES NEEDS AT THIS TIME IV TO RIGHT
FOREARM PATENT AND INTACT SRX2 BED AT LOWEST SETTING CALL LIGHT WITHIN REACH
WILL CONTINUE TO MONITOR

## 2017-04-18 VITALS — DIASTOLIC BLOOD PRESSURE: 70 MMHG | SYSTOLIC BLOOD PRESSURE: 132 MMHG

## 2017-04-18 VITALS — SYSTOLIC BLOOD PRESSURE: 134 MMHG | DIASTOLIC BLOOD PRESSURE: 70 MMHG

## 2017-04-18 VITALS — SYSTOLIC BLOOD PRESSURE: 128 MMHG | DIASTOLIC BLOOD PRESSURE: 75 MMHG

## 2017-04-18 VITALS — DIASTOLIC BLOOD PRESSURE: 66 MMHG | SYSTOLIC BLOOD PRESSURE: 122 MMHG

## 2017-04-18 VITALS — DIASTOLIC BLOOD PRESSURE: 64 MMHG | SYSTOLIC BLOOD PRESSURE: 125 MMHG

## 2017-04-18 VITALS — SYSTOLIC BLOOD PRESSURE: 132 MMHG | DIASTOLIC BLOOD PRESSURE: 70 MMHG

## 2017-04-18 LAB
ANION GAP SERPL CALC-SCNC: 14.5 MMOL/L (ref 8–16)
BASOPHILS NFR BLD AUTO: 0 % (ref 0–2)
BUN SERPL-MCNC: 68 MG/DL (ref 7–18)
CALCIUM SERPL-MCNC: 8 MG/DL (ref 8.5–10.1)
CHLORIDE SERPL-SCNC: 108 MMOL/L (ref 98–107)
CO2 SERPL-SCNC: 21.6 MMOL/L (ref 21–32)
CREAT SERPL-MCNC: 2.6 MG/DL (ref 0.6–1.3)
EOSINOPHIL NFR BLD: 0 % (ref 0–7)
ERYTHROCYTE [DISTWIDTH] IN BLOOD BY AUTOMATED COUNT: 15.4 % (ref 11.5–14.5)
GLUCOSE SERPL-MCNC: 145 MG/DL (ref 74–106)
HCT VFR BLD CALC: 28.1 % (ref 42–54)
HGB BLD-MCNC: 9.4 G/DL (ref 13.5–17.5)
IMM GRANULOCYTES NFR BLD: 4.8 % (ref 0–5)
LYMPHOCYTES NFR BLD AUTO: 10.5 % (ref 15–50)
MAGNESIUM SERPL-MCNC: 1.8 MG/DL (ref 1.8–2.4)
MCH RBC QN AUTO: 31.6 PG (ref 26–34)
MCHC RBC AUTO-ENTMCNC: 33.5 G/DL (ref 31–37)
MCV RBC: 94.6 FL (ref 80–100)
MONOCYTES NFR BLD: 4.8 % (ref 2–11)
NEUTROPHILS NFR BLD AUTO: 79.9 % (ref 40–80)
OSMOLALITY SERPL CALC.SUM OF ELEC: 300 MOSM/KG (ref 275–300)
PLATELET # BLD: 123 10X3/UL (ref 130–400)
PMV BLD AUTO: 10.2 FL (ref 7.4–10.4)
POTASSIUM SERPL-SCNC: 5.1 MMOL/L (ref 3.5–5.1)
RBC # BLD AUTO: 2.97 10X6/UL (ref 4.2–6.1)
SODIUM SERPL-SCNC: 139 MMOL/L (ref 136–145)
WBC # BLD AUTO: 5.5 10X3/UL (ref 4.8–10.8)

## 2017-04-18 NOTE — NUR
PT AOX4 RESP EVEN AND NONLABORED SRX2 CALL LIGHT WITHIN REACH PT DENIES NEEDS
AT THIS TIME IV TO RIGHT FOREARM PATENT AND INTACT BED AT LOWEST SETTING WILL
CONTINUE TO MONITOR

## 2017-04-18 NOTE — NUR
PATIENT SITTING UP IN CHAIR WATCHING TV. AAOX4. RR EVEN AND UNLABORED. O2 @ 2L
VIA NC. 0 S/S OF DISTRESS. STATES PAIN IS A 4/10 BECAUSE OF THE REDNESS ON HIS
BUTTOCKS. IV TO RIGHT FA PATENT WITH NO REDNESS OR SWELLING. TELEMETRY ON.
CALL LIGHT WITHIN REACH.

## 2017-04-18 NOTE — NUR
ASSESSMENT COMPLETE. NIGHTTIME MEDS GIVEN. ASSISTED PATIENT BACK INTO BED.
NO OTHER NEEDS AT THIS TIME.

## 2017-04-18 NOTE — NUR
PATIENT RESTING IN BED AND DENIES NEEDS AT THIS TIME. BED IN LOWEST POSITION
AND CALL LIGHT WITHIN REACH. ENCOURAGED PATIENT TO CALL IF HE HAS FURTHER
NEEDS.

## 2017-04-18 NOTE — NUR
NUTRITION MONITORING & EVAL
PT VISIT. PT TOLERATING RENAL DIET WITH GOOD PO INTAKE. WILL CONTINUE TO
PROVIDE DIET, HONOR FOOD PREFERENCES WITHIN DIET RESTRICTIONS. MONITOR PO
INTAKE.
RD FOLLOWING

## 2017-04-19 VITALS — DIASTOLIC BLOOD PRESSURE: 63 MMHG | SYSTOLIC BLOOD PRESSURE: 120 MMHG

## 2017-04-19 VITALS — SYSTOLIC BLOOD PRESSURE: 121 MMHG | DIASTOLIC BLOOD PRESSURE: 66 MMHG

## 2017-04-19 VITALS — SYSTOLIC BLOOD PRESSURE: 106 MMHG | DIASTOLIC BLOOD PRESSURE: 60 MMHG

## 2017-04-19 VITALS — DIASTOLIC BLOOD PRESSURE: 62 MMHG | SYSTOLIC BLOOD PRESSURE: 125 MMHG

## 2017-04-19 VITALS — SYSTOLIC BLOOD PRESSURE: 128 MMHG | DIASTOLIC BLOOD PRESSURE: 75 MMHG

## 2017-04-19 LAB
ALBUMIN SERPL-MCNC: 2.2 G/DL (ref 3.4–5)
ALP SERPL-CCNC: 132 U/L (ref 46–116)
ALT SERPL-CCNC: 51 U/L (ref 10–68)
ANION GAP SERPL CALC-SCNC: 18.1 MMOL/L (ref 8–16)
BASOPHILS NFR BLD AUTO: 0.2 % (ref 0–2)
BILIRUB SERPL-MCNC: 0.29 MG/DL (ref 0.2–1.3)
BUN SERPL-MCNC: 78 MG/DL (ref 7–18)
CALCIUM SERPL-MCNC: 7.9 MG/DL (ref 8.5–10.1)
CHLORIDE SERPL-SCNC: 107 MMOL/L (ref 98–107)
CO2 SERPL-SCNC: 21 MMOL/L (ref 21–32)
CREAT SERPL-MCNC: 2.6 MG/DL (ref 0.6–1.3)
EOSINOPHIL NFR BLD: 0 % (ref 0–7)
ERYTHROCYTE [DISTWIDTH] IN BLOOD BY AUTOMATED COUNT: 15.7 % (ref 11.5–14.5)
GLOBULIN SER-MCNC: 3 G/L
GLUCOSE SERPL-MCNC: 154 MG/DL (ref 74–106)
HCT VFR BLD CALC: 28 % (ref 42–54)
HGB BLD-MCNC: 9.3 G/DL (ref 13.5–17.5)
IMM GRANULOCYTES NFR BLD: 7 % (ref 0–5)
LYMPHOCYTES NFR BLD AUTO: 8.9 % (ref 15–50)
MCH RBC QN AUTO: 32.3 PG (ref 26–34)
MCHC RBC AUTO-ENTMCNC: 33.2 G/DL (ref 31–37)
MCV RBC: 97.2 FL (ref 80–100)
MONOCYTES NFR BLD: 2.8 % (ref 2–11)
NEUTROPHILS NFR BLD AUTO: 81.1 % (ref 40–80)
OSMOLALITY SERPL CALC.SUM OF ELEC: 306 MOSM/KG (ref 275–300)
PLATELET # BLD: 137 10X3/UL (ref 130–400)
PMV BLD AUTO: 10.9 FL (ref 7.4–10.4)
POTASSIUM SERPL-SCNC: 5.1 MMOL/L (ref 3.5–5.1)
PROT SERPL-MCNC: 5.2 G/DL (ref 6.4–8.2)
RBC # BLD AUTO: 2.88 10X6/UL (ref 4.2–6.1)
SODIUM SERPL-SCNC: 141 MMOL/L (ref 136–145)
WBC # BLD AUTO: 6.4 10X3/UL (ref 4.8–10.8)

## 2017-04-19 NOTE — NUR
AWAKE AND ALERT. ORIENTED X3. SITTING UP IN CHAIR AT BEDSIDE. LUNGS HAVE FAINT
CRACKLES ON LEFT SIDE, OCCASSIONAL DRY COUGH NOTED. SKIN IS INTACT WITHOUT
REDNESS EXCEPT REDNESS TO COCCYX NOTED. BUTT BALM APPLIED TO AREA. WILL
MONITOR. SL TO RIGHT FOREARM IS PATENT WTIHOUT REDNESS AT INSERTION SITE.
DENIES NEEDS. ATE ALL OF BREAKFAST.

## 2017-04-19 NOTE — NUR
Rehab Pres[facility] order received. Will plan to admit to [facility] IRF tomorrow. Spoke
with the patient and his wife and answered all questions concerning acute
rehab. [facility] contacted- spoke with dayami Saunders PreAut required.
Ref #[id number]. Spoke with RODNEY Garsia about patient being accepted for admit
Thurs 4/20. Thank you for this referral!
Alesia Cabrera RN
Clinical Liaison, [facility]/[place]

## 2017-04-20 ENCOUNTER — HOSPITAL ENCOUNTER (INPATIENT)
Dept: HOSPITAL 84 - D.REHAB | Age: 82
LOS: 7 days | Discharge: HOME | DRG: 189 | End: 2017-04-27
Admitting: FAMILY MEDICINE
Payer: MEDICARE

## 2017-04-20 VITALS — SYSTOLIC BLOOD PRESSURE: 123 MMHG | DIASTOLIC BLOOD PRESSURE: 78 MMHG

## 2017-04-20 VITALS — SYSTOLIC BLOOD PRESSURE: 121 MMHG | DIASTOLIC BLOOD PRESSURE: 64 MMHG

## 2017-04-20 VITALS — SYSTOLIC BLOOD PRESSURE: 120 MMHG | DIASTOLIC BLOOD PRESSURE: 61 MMHG

## 2017-04-20 DIAGNOSIS — I50.22: ICD-10-CM

## 2017-04-20 DIAGNOSIS — J84.10: ICD-10-CM

## 2017-04-20 DIAGNOSIS — I35.0: ICD-10-CM

## 2017-04-20 DIAGNOSIS — R73.9: ICD-10-CM

## 2017-04-20 DIAGNOSIS — E78.5: ICD-10-CM

## 2017-04-20 DIAGNOSIS — D62: ICD-10-CM

## 2017-04-20 DIAGNOSIS — R53.81: ICD-10-CM

## 2017-04-20 DIAGNOSIS — N17.9: ICD-10-CM

## 2017-04-20 DIAGNOSIS — N18.4: ICD-10-CM

## 2017-04-20 DIAGNOSIS — J96.21: Primary | ICD-10-CM

## 2017-04-20 DIAGNOSIS — R53.83: ICD-10-CM

## 2017-04-20 DIAGNOSIS — J43.9: ICD-10-CM

## 2017-04-20 DIAGNOSIS — N40.0: ICD-10-CM

## 2017-04-20 DIAGNOSIS — I13.0: ICD-10-CM

## 2017-04-20 DIAGNOSIS — Z86.73: ICD-10-CM

## 2017-04-20 DIAGNOSIS — D69.6: ICD-10-CM

## 2017-04-20 LAB
ANION GAP SERPL CALC-SCNC: 15 MMOL/L (ref 8–16)
BUN SERPL-MCNC: 76 MG/DL (ref 7–18)
CALCIUM SERPL-MCNC: 8 MG/DL (ref 8.5–10.1)
CHLORIDE SERPL-SCNC: 107 MMOL/L (ref 98–107)
CO2 SERPL-SCNC: 25.5 MMOL/L (ref 21–32)
CREAT SERPL-MCNC: 2.6 MG/DL (ref 0.6–1.3)
GLUCOSE SERPL-MCNC: 115 MG/DL (ref 74–106)
OSMOLALITY SERPL CALC.SUM OF ELEC: 306 MOSM/KG (ref 275–300)
POTASSIUM SERPL-SCNC: 5.5 MMOL/L (ref 3.5–5.1)
SODIUM SERPL-SCNC: 142 MMOL/L (ref 136–145)

## 2017-04-20 NOTE — NUR
PATIENT IS RESTING IN BED. DENIES PAIN AT THIS TIME. DENIES FURTHER NEEDS AT
THIS TIME. NO DISTRESS NOTED. BED LOW, LOCKED, CALL LIGHT IN REACH.

## 2017-04-20 NOTE — NUR
PATIENT IS RESTING IN BED. NO DISTRESS NOTED. DENIED PAIN AT THIS TIME. DENIED
FURTHER NEEDS AT THIS TIME. PT IS GOING TO REHAB TODAY. INSTRUCTED TO CALL IF
NEEDED ANYTHING. BED LOW, LOCKED, CALL LIGHT IN REACH.

## 2017-04-20 NOTE — NUR
PATIENT LYING IN BED. NO DISTRESS NOTED. DENIED PAIN AT THIS TIME. IS WANTING
COFFEE. DENIED FUTHER NEEDS AT THIS TIME. WILL ADMINISTER MEDS AS PRESCRIBED.
BED LOW, LOCKED, CALL LIGHT IN REACH.

## 2017-04-20 NOTE — NUR
AWAKE AND ALERT. ORIENTED X3. NO C/O THIS AM. LUNGS ARE CLEAR BILATERALLY BUT
DIMINISHED THROUGHOUT. NO COUGH NOTED. SKIN IS INTACT WITHOUT REDNESS EXCEPT
TO COCCYX AREA WHICH IS REDDENED BUT NOT BROKEN. SL TO RIGHT FOREARM IS PATENT
WITHOUT REDNESS AT INSERTION SITE. DENIES NEEDS.

## 2017-04-21 LAB
BASOPHILS NFR BLD AUTO: 0.2 % (ref 0–2)
EOSINOPHIL NFR BLD: 0.8 % (ref 0–7)
ERYTHROCYTE [DISTWIDTH] IN BLOOD BY AUTOMATED COUNT: 16.2 % (ref 11.5–14.5)
HCT VFR BLD CALC: 28 % (ref 42–54)
HGB BLD-MCNC: 9.3 G/DL (ref 13.5–17.5)
IMM GRANULOCYTES NFR BLD: 5.9 % (ref 0–5)
LYMPHOCYTES NFR BLD AUTO: 22.3 % (ref 15–50)
MCH RBC QN AUTO: 32 PG (ref 26–34)
MCHC RBC AUTO-ENTMCNC: 33.2 G/DL (ref 31–37)
MCV RBC: 96.2 FL (ref 80–100)
MONOCYTES NFR BLD: 7.5 % (ref 2–11)
NEUTROPHILS NFR BLD AUTO: 63.3 % (ref 40–80)
PLATELET # BLD: 128 10X3/UL (ref 130–400)
PMV BLD AUTO: 10.8 FL (ref 7.4–10.4)
RBC # BLD AUTO: 2.91 10X6/UL (ref 4.2–6.1)
WBC # BLD AUTO: 6.6 10X3/UL (ref 4.8–10.8)

## 2017-04-21 NOTE — DS
PATIENT:KIRA UP                   :[birth year]   MEDICAL RECORD: [id number]
 
                              DISCHARGE SUMMARY
                                                         
ADMISSION DATE:    17                       DISCHARGE DATE:     17
 
 
DATE OF ADMISSION:  2017
 
DATE OF DISCHARGE:  2017
 
ADMISSION DIAGNOSES:  Pneumonia, pancytopenia, acute-on-chronic renal
insufficiency, generalized weakness.
 
DISCHARGE DIAGNOSES:  Pneumonia, exacerbation of chronic obstructive pulmonary
disease, pulmonary fibrosis, acute-on-chronic renal insufficiency, generalized
weakness.
 
HOSPITAL COURSE:  The patient was admitted to the Emergency Room with worsening
shortness of breath.  He has a history significant for pulmonary fibrosis,
pulmonary hypertension, bypass surgery, valve replacement, chronic kidney
disease, COPD, has increasing shortness of breath.  His workup was ensued.  CBC
showed white count of 5000, hemoglobin 10.1, hematocrit 30.1, platelets 66,
neutrophils 85.7.  Chest x-ray showed increased interstitial markings.  No
pleural effusion.  There was concern for evolving infiltrate.  The patient was
admitted.  Pulmonology consulted.  Hematology consulted for pancytopenia. 
Nephrology consulted with his worsening renal function.  The patient is
cautiously given fluids.  Cultures were negative.  Stool is negative for occult
blood.  No significant findings on hematology workup.  Recommend bone marrow
biopsy as outpatient.  The patient remained stable.  He is ambulating with
oxygen, anxious to go home, does not qualify for rehab through his insurance,
will be discharged to home with home health, physical therapy and respiratory
therapy.  The patient was discharged to home in stable and improved condition.
 
VITAL SIGNS ON DISCHARGE:  Temperature 97.5, heart rate 78, respirations 18,
blood pressure 123/78, O2 sats 97% on 2 L via nasal cannula.  The patient will
follow up with Dr. Crabtree in 2-4 weeks.  He will follow up in the clinic in 10
days and as needed.
 
LABORATORY DATA:  Chemistry on discharge:  Sodium 141, potassium 5.1, chloride
107, bicarbonate 21, BUN 78, creatinine 2.6, this has returned to baseline. 
Please see chart for further details.
 
MEDICATIONS:  Per med rec.
 
TRANSINT:[id number] Voice Confirmation ID: [id number] DOCUMENT ID: [id number]
                                           
                                           EUSEBIA MOHAMUD DO            
 
 
 
Electronically Signed by EUSEBIA HOWE on 17 at 0754
CC:                                                             [id number]
DICTATION DATE: 17 08     :     17 1103      DIS IN  
                                                                      17
[facility]                                          
[address] [place] [address]

## 2017-04-24 LAB
ANION GAP SERPL CALC-SCNC: 15.9 MMOL/L (ref 8–16)
BASOPHILS NFR BLD AUTO: 0.1 % (ref 0–2)
BUN SERPL-MCNC: 81 MG/DL (ref 7–18)
CALCIUM SERPL-MCNC: 7.7 MG/DL (ref 8.5–10.1)
CHLORIDE SERPL-SCNC: 108 MMOL/L (ref 98–107)
CO2 SERPL-SCNC: 21.1 MMOL/L (ref 21–32)
CREAT SERPL-MCNC: 2.6 MG/DL (ref 0.6–1.3)
EOSINOPHIL NFR BLD: 0.1 % (ref 0–7)
ERYTHROCYTE [DISTWIDTH] IN BLOOD BY AUTOMATED COUNT: 16.7 % (ref 11.5–14.5)
GLUCOSE SERPL-MCNC: 120 MG/DL (ref 74–106)
HCT VFR BLD CALC: 30.7 % (ref 42–54)
HGB BLD-MCNC: 10.3 G/DL (ref 13.5–17.5)
IMM GRANULOCYTES NFR BLD: 1.8 % (ref 0–5)
LYMPHOCYTES NFR BLD AUTO: 6.3 % (ref 15–50)
MCH RBC QN AUTO: 32.3 PG (ref 26–34)
MCHC RBC AUTO-ENTMCNC: 33.6 G/DL (ref 31–37)
MCV RBC: 96.2 FL (ref 80–100)
MONOCYTES NFR BLD: 3.3 % (ref 2–11)
NEUTROPHILS NFR BLD AUTO: 88.4 % (ref 40–80)
OSMOLALITY SERPL CALC.SUM OF ELEC: 303 MOSM/KG (ref 275–300)
PLATELET # BLD: 105 10X3/UL (ref 130–400)
PMV BLD AUTO: 11.2 FL (ref 7.4–10.4)
POTASSIUM SERPL-SCNC: 5 MMOL/L (ref 3.5–5.1)
RBC # BLD AUTO: 3.19 10X6/UL (ref 4.2–6.1)
SODIUM SERPL-SCNC: 140 MMOL/L (ref 136–145)
WBC # BLD AUTO: 8.3 10X3/UL (ref 4.8–10.8)

## 2017-04-26 LAB
ANION GAP SERPL CALC-SCNC: 15.5 MMOL/L (ref 8–16)
BASOPHILS NFR BLD AUTO: 0 % (ref 0–2)
BUN SERPL-MCNC: 97 MG/DL (ref 7–18)
CALCIUM SERPL-MCNC: 8 MG/DL (ref 8.5–10.1)
CHLORIDE SERPL-SCNC: 105 MMOL/L (ref 98–107)
CO2 SERPL-SCNC: 23.3 MMOL/L (ref 21–32)
CREAT SERPL-MCNC: 2.6 MG/DL (ref 0.6–1.3)
EOSINOPHIL NFR BLD: 0 % (ref 0–7)
ERYTHROCYTE [DISTWIDTH] IN BLOOD BY AUTOMATED COUNT: 17.2 % (ref 11.5–14.5)
GLUCOSE SERPL-MCNC: 138 MG/DL (ref 74–106)
HCT VFR BLD CALC: 31.7 % (ref 42–54)
HGB BLD-MCNC: 10.7 G/DL (ref 13.5–17.5)
IMM GRANULOCYTES NFR BLD: 1.1 % (ref 0–5)
LYMPHOCYTES NFR BLD AUTO: 9.4 % (ref 15–50)
MCH RBC QN AUTO: 32.3 PG (ref 26–34)
MCHC RBC AUTO-ENTMCNC: 33.8 G/DL (ref 31–37)
MCV RBC: 95.8 FL (ref 80–100)
MONOCYTES NFR BLD: 8.5 % (ref 2–11)
NEUTROPHILS NFR BLD AUTO: 81 % (ref 40–80)
OSMOLALITY SERPL CALC.SUM OF ELEC: 309 MOSM/KG (ref 275–300)
PLATELET # BLD EST: (no result) 10*3/UL
PLATELET # BLD: 84 10X3/UL (ref 130–400)
PMV BLD AUTO: 12 FL (ref 7.4–10.4)
POTASSIUM SERPL-SCNC: 4.8 MMOL/L (ref 3.5–5.1)
RBC # BLD AUTO: 3.31 10X6/UL (ref 4.2–6.1)
SODIUM SERPL-SCNC: 139 MMOL/L (ref 136–145)
WBC # BLD AUTO: 7 10X3/UL (ref 4.8–10.8)

## 2017-07-07 ENCOUNTER — HOSPITAL ENCOUNTER (OUTPATIENT)
Dept: HOSPITAL 84 - D.RT | Age: 82
Discharge: HOME | End: 2017-07-07
Attending: INTERNAL MEDICINE
Payer: MEDICARE

## 2017-07-07 VITALS — BODY MASS INDEX: 23.5 KG/M2

## 2017-07-07 DIAGNOSIS — J44.9: Primary | ICD-10-CM

## 2017-07-07 DIAGNOSIS — J84.10: ICD-10-CM

## 2017-07-07 LAB
ALBUMIN SERPL-MCNC: 3.1 G/DL (ref 3.4–5)
ALP SERPL-CCNC: 79 U/L (ref 46–116)
ALT SERPL-CCNC: 18 U/L (ref 10–68)
BILIRUB DIRECT SERPL-MCNC: 0.08 MG/DL (ref 0–0.3)
BILIRUB INDIRECT SERPL-MCNC: 0.22 MG/DL (ref 0–1)
BILIRUB SERPL-MCNC: 0.3 MG/DL (ref 0.2–1.3)
GLOBULIN SER-MCNC: 3.5 G/L
PROT SERPL-MCNC: 6.6 G/DL (ref 6.4–8.2)

## 2017-08-09 ENCOUNTER — HOSPITAL ENCOUNTER (OUTPATIENT)
Dept: HOSPITAL 84 - D.US | Age: 82
Discharge: HOME | End: 2017-08-09
Attending: THORACIC SURGERY (CARDIOTHORACIC VASCULAR SURGERY)
Payer: MEDICARE

## 2017-08-09 VITALS — BODY MASS INDEX: 23.5 KG/M2

## 2017-08-09 DIAGNOSIS — I65.23: Primary | ICD-10-CM

## 2017-11-14 ENCOUNTER — HOSPITAL ENCOUNTER (INPATIENT)
Dept: HOSPITAL 84 - D.M2 | Age: 82
LOS: 3 days | Discharge: HOME HEALTH | DRG: 683 | End: 2017-11-17
Attending: FAMILY MEDICINE | Admitting: FAMILY MEDICINE
Payer: MEDICARE

## 2017-11-14 VITALS — DIASTOLIC BLOOD PRESSURE: 84 MMHG | SYSTOLIC BLOOD PRESSURE: 144 MMHG

## 2017-11-14 VITALS — WEIGHT: 152 LBS | BODY MASS INDEX: 21.28 KG/M2 | BODY MASS INDEX: 21.28 KG/M2 | HEIGHT: 71 IN

## 2017-11-14 VITALS — DIASTOLIC BLOOD PRESSURE: 84 MMHG | SYSTOLIC BLOOD PRESSURE: 146 MMHG

## 2017-11-14 DIAGNOSIS — E83.51: ICD-10-CM

## 2017-11-14 DIAGNOSIS — N17.9: Primary | ICD-10-CM

## 2017-11-14 DIAGNOSIS — N25.81: ICD-10-CM

## 2017-11-14 DIAGNOSIS — J84.10: ICD-10-CM

## 2017-11-14 DIAGNOSIS — Z95.2: ICD-10-CM

## 2017-11-14 DIAGNOSIS — Z86.73: ICD-10-CM

## 2017-11-14 DIAGNOSIS — N40.0: ICD-10-CM

## 2017-11-14 DIAGNOSIS — I25.10: ICD-10-CM

## 2017-11-14 DIAGNOSIS — E46: ICD-10-CM

## 2017-11-14 DIAGNOSIS — J44.9: ICD-10-CM

## 2017-11-14 DIAGNOSIS — D63.1: ICD-10-CM

## 2017-11-14 DIAGNOSIS — I12.9: ICD-10-CM

## 2017-11-14 DIAGNOSIS — N18.4: ICD-10-CM

## 2017-11-14 DIAGNOSIS — Z95.1: ICD-10-CM

## 2017-11-14 DIAGNOSIS — Z87.891: ICD-10-CM

## 2017-11-14 LAB
ALBUMIN SERPL-MCNC: 2.8 G/DL (ref 3.4–5)
ALP SERPL-CCNC: 72 U/L (ref 46–116)
ALT SERPL-CCNC: 19 U/L (ref 10–68)
ANION GAP SERPL CALC-SCNC: 18.1 MMOL/L (ref 8–16)
BASOPHILS NFR BLD AUTO: 1.6 % (ref 0–2)
BILIRUB DIRECT SERPL-MCNC: 0.08 MG/DL (ref 0–0.3)
BILIRUB INDIRECT SERPL-MCNC: 0.22 MG/DL (ref 0–1)
BILIRUB SERPL-MCNC: 0.3 MG/DL (ref 0.2–1.3)
BUN SERPL-MCNC: 51 MG/DL (ref 7–18)
CALCIUM SERPL-MCNC: 7.7 MG/DL (ref 8.5–10.1)
CHLORIDE SERPL-SCNC: 108 MMOL/L (ref 98–107)
CO2 SERPL-SCNC: 17.8 MMOL/L (ref 21–32)
CREAT SERPL-MCNC: 4.7 MG/DL (ref 0.6–1.3)
EOSINOPHIL NFR BLD: 7.9 % (ref 0–7)
ERYTHROCYTE [DISTWIDTH] IN BLOOD BY AUTOMATED COUNT: 16.3 % (ref 11.5–14.5)
GLOBULIN SER-MCNC: 3.5 G/L
GLUCOSE SERPL-MCNC: 111 MG/DL (ref 74–106)
HCT VFR BLD CALC: 29.4 % (ref 42–54)
HGB BLD-MCNC: 9.9 G/DL (ref 13.5–17.5)
IMM GRANULOCYTES NFR BLD: 0.4 % (ref 0–5)
LYMPHOCYTES NFR BLD AUTO: 24.2 % (ref 15–50)
MAGNESIUM SERPL-MCNC: 1.9 MG/DL (ref 1.8–2.4)
MCH RBC QN AUTO: 32.7 PG (ref 26–34)
MCHC RBC AUTO-ENTMCNC: 33.7 G/DL (ref 31–37)
MCV RBC: 97 FL (ref 80–100)
MONOCYTES NFR BLD: 7.5 % (ref 2–11)
NEUTROPHILS NFR BLD AUTO: 58.4 % (ref 40–80)
OSMOLALITY SERPL CALC.SUM OF ELEC: 290 MOSM/KG (ref 275–300)
PHOSPHATE SERPL-MCNC: 6.2 MG/DL (ref 2.5–4.9)
PLATELET # BLD EST: (no result) 10*3/UL
PLATELET # BLD: 72 10X3/UL (ref 130–400)
PMV BLD AUTO: 10.7 FL (ref 7.4–10.4)
POTASSIUM SERPL-SCNC: 5.9 MMOL/L (ref 3.5–5.1)
PROT SERPL-MCNC: 6.3 G/DL (ref 6.4–8.2)
RBC # BLD AUTO: 3.03 10X6/UL (ref 4.2–6.1)
SODIUM SERPL-SCNC: 138 MMOL/L (ref 136–145)
WBC # BLD AUTO: 6.8 10X3/UL (ref 4.8–10.8)

## 2017-11-14 NOTE — NUR
PATIENT IS RESTING COMFORTABLY. UP WITH ONE PERSON ASSIST TO BATHROOM. HAS
REMOVED HIS DENTURES AND IS READY FOR BED. CALL LIGHT IN REACH BED
IN LOW POSITION.

## 2017-11-14 NOTE — NUR
MEDS NOT AVAILABLE FROM PHARMACY. NETTIE CONNER AS WELL AS KADE. TELEMERTY
SHOWS SR. DENIES ANY NEEDS. SR UP WITH CALL LIGHT IN REACH

## 2017-11-15 VITALS — SYSTOLIC BLOOD PRESSURE: 145 MMHG | DIASTOLIC BLOOD PRESSURE: 60 MMHG

## 2017-11-15 VITALS — DIASTOLIC BLOOD PRESSURE: 75 MMHG | SYSTOLIC BLOOD PRESSURE: 127 MMHG

## 2017-11-15 VITALS — DIASTOLIC BLOOD PRESSURE: 75 MMHG | SYSTOLIC BLOOD PRESSURE: 126 MMHG

## 2017-11-15 VITALS — DIASTOLIC BLOOD PRESSURE: 79 MMHG | SYSTOLIC BLOOD PRESSURE: 139 MMHG

## 2017-11-15 VITALS — SYSTOLIC BLOOD PRESSURE: 142 MMHG | DIASTOLIC BLOOD PRESSURE: 79 MMHG

## 2017-11-15 VITALS — SYSTOLIC BLOOD PRESSURE: 145 MMHG | DIASTOLIC BLOOD PRESSURE: 78 MMHG

## 2017-11-15 LAB
ALBUMIN SERPL-MCNC: 2.8 G/DL (ref 3.4–5)
ALP SERPL-CCNC: 71 U/L (ref 46–116)
ALT SERPL-CCNC: 17 U/L (ref 10–68)
ANION GAP SERPL CALC-SCNC: 19 MMOL/L (ref 8–16)
APPEARANCE UR: CLEAR
BACTERIA #/AREA URNS HPF: (no result) /HPF
BASOPHILS NFR BLD AUTO: 0.6 % (ref 0–2)
BILIRUB SERPL-MCNC: 0.35 MG/DL (ref 0.2–1.3)
BILIRUB SERPL-MCNC: NEGATIVE MG/DL
BUN SERPL-MCNC: 51 MG/DL (ref 7–18)
CALCIUM SERPL-MCNC: 7.3 MG/DL (ref 8.5–10.1)
CHLORIDE SERPL-SCNC: 110 MMOL/L (ref 98–107)
CO2 SERPL-SCNC: 17 MMOL/L (ref 21–32)
COLOR UR: YELLOW
CREAT SERPL-MCNC: 4.5 MG/DL (ref 0.6–1.3)
EOSINOPHIL NFR BLD: 8.3 % (ref 0–7)
ERYTHROCYTE [DISTWIDTH] IN BLOOD BY AUTOMATED COUNT: 16 % (ref 11.5–14.5)
ERYTHROCYTE [SEDIMENTATION RATE] IN BLOOD: 5 MM/HR (ref 0–20)
GLOBULIN SER-MCNC: 3.3 G/L
GLUCOSE SERPL-MCNC: 50 MG/DL
GLUCOSE SERPL-MCNC: 87 MG/DL (ref 74–106)
HCT VFR BLD CALC: 29.6 % (ref 42–54)
HGB BLD-MCNC: 9.8 G/DL (ref 13.5–17.5)
IMM GRANULOCYTES NFR BLD: 0.6 % (ref 0–5)
KETONES UR STRIP-MCNC: (no result) MG/DL
LYMPHOCYTES NFR BLD AUTO: 36.3 % (ref 15–50)
MAGNESIUM SERPL-MCNC: 1.9 MG/DL (ref 1.8–2.4)
MCH RBC QN AUTO: 31.7 PG (ref 26–34)
MCHC RBC AUTO-ENTMCNC: 33.1 G/DL (ref 31–37)
MCV RBC: 95.8 FL (ref 80–100)
MONOCYTES NFR BLD: 9.1 % (ref 2–11)
MUCOUS THREADS #/AREA URNS LPF: (no result) /LPF
NEUTROPHILS NFR BLD AUTO: 45.1 % (ref 40–80)
NITRITE UR-MCNC: NEGATIVE MG/ML
OSMOLALITY SERPL CALC.SUM OF ELEC: 293 MOSM/KG (ref 275–300)
PH UR STRIP: 5 [PH] (ref 5–6)
PHOSPHATE SERPL-MCNC: 5.4 MG/DL (ref 2.5–4.9)
PLATELET # BLD: 81 10X3/UL (ref 130–400)
PMV BLD AUTO: 11.6 FL (ref 7.4–10.4)
POTASSIUM SERPL-SCNC: 5 MMOL/L (ref 3.5–5.1)
PROT SERPL-MCNC: 6.1 G/DL (ref 6.4–8.2)
PROT UR-MCNC: (no result) MG/DL
RBC # BLD AUTO: 3.09 10X6/UL (ref 4.2–6.1)
RBC #/AREA URNS HPF: (no result) /HPF (ref 0–5)
SODIUM SERPL-SCNC: 141 MMOL/L (ref 136–145)
SP GR UR STRIP: 1.01 (ref 1–1.02)
SQUAMOUS #/AREA URNS HPF: (no result) /HPF (ref 0–5)
UROBILINOGEN UR-MCNC: NORMAL MG/DL
WBC # BLD AUTO: 6.8 10X3/UL (ref 4.8–10.8)
WBC #/AREA URNS HPF: (no result) /HPF (ref 0–5)

## 2017-11-15 NOTE — HP
PATIENT: KIRA UP                                 MEDICAL RECORD: [id number]
ACCOUNT: [id number]                                    LOCATION:[address]
: [birth year]                                            ADMISSION DATE: 17
                                                         
 
                             HISTORY AND PHYSICAL EXAMINATION
 
 
HISTORY OF PRESENT ILLNESS:  An 82-year-old  male who presents to the
clinic today, followup significant hypocalcemia, worsening of his
acute-on-chronic renal disease, fatigue, and worsening anemia.  The patient was
seen last week.  Labs returned today which showed creatinine elevated to 4.15,
calcium down to 7.7, hemoglobin is down to 9.3, hematocrit down to 29.4.  The
patient has gradual worsening fatigue.  Denies chest pain.  Denies palpitations.
 
PAST MEDICAL HISTORY:  Significant for chronic kidney disease, COPD, BPH,
history of TIAs, anemia of chronic disease, and hyperlipidemia.
 
CURRENT MEDICATIONS:  Listed as Combivent and Symbicort.
 
ALLERGIES:  No known drug allergies.
 
The patient has had prior nephrology evaluations, but none in the past several
years other than with hospitalizations.
 
REVIEW OF SYSTEMS:
GENERAL:  Admits gradual weight loss.  Denies any significant change in
appetite.
HEENT:  No cephalgia, visual changes, tinnitus, epistaxis, or dysphagia.
CARDIOVASCULAR:  Denies chest pain.  Denies palpitations.
PULMONARY:  Denies hemoptysis.  Denies night sweats.
GASTROINTESTINAL:  Denies hematemesis, hematochezia, or melena.
GENITOURINARY:  Denies dysuria.
MUSCULOSKELETAL:  Generalized weakness.  No acute changes.  Gradual general
decline in activity level.
 
PHYSICAL EXAMINATION:
VITAL SIGNS:  Height 5 feet 11 inches, weight 150, BMI is 20.9.  Blood pressure
is 140/76, heart rate 82, O2 sats 98%, temperature 97.8.
GENERAL:  Alert and oriented, no present distress.
HEENT:  Head is normocephalic, atraumatic.  Eyes:  Pupils are equally round and
reactive to light and accommodation.  Extraocular muscles intact.  Conjunctiva
was not injected.  Ears:  Canals patent, TMs are intact.  Nose:  Nares patent
without drainage.  Throat:  No erythema, no exudates.
NECK:  Supple.  No lymphadenopathy, no JVD.
HEART:  Regular rate and rhythm.  No S3 or S4.  No rub.
LUNGS:  Clear to auscultation bilaterally.  Breathing is nonlabored.
ABDOMEN:  Soft, nontender.  Bowel sounds all 4 quadrants.
EXTREMITIES:  Present times 4, no edema.
NEUROLOGIC:  Intact.
SKIN:  Warm, dry.  No rash.
 
LABORATORY DATA:  Chemistry shows a sodium of 141, potassium 4.99, chloride 113,
bicarbonate 21.9, calcium is 7.7, BUN is 46, creatinine 4.15.  CBC:  White count
5.9, hemoglobin 9.3, hematocrit 29.4, platelets 77.
 
ASSESSMENT AND PLAN:
1.  Acute-on-chronic kidney disease.
 
 
 
HISTORY AND PHYSICAL                           [id number]    KIRA UP         
 
 
2.  Hypocalcemia.
3.  Anemia of chronic disease.  The patient is admitted.  We will start on
calcium infusion, consult nephrology.  CBC, magnesium, and phosphorous.  BMP on
admission, EKG on admission, PTH, vitamin D, LFTs on admission, and the IV D5W
with calcium gluconate at 0.2 meals per kilogram per hour.  Repeat labs in a.m. 
Renal diet.
 
TRANSINT:[id number] Voice Confirmation ID: [id number] DOCUMENT ID: [id number]
 
 
                                           
                                           EUSEBIA MOHAMUD DO            
 
 
 
Electronically Signed by EUSEBIA HOWE on 11/15/17 at 0753
 
 
 
 
 
 
 
 
 
 
 
 
 
 
 
 
 
 
 
 
 
 
 
 
 
 
 
 
 
 
 
CC:                                                             [id number]
DICTATION DATE: 17 1633     :     17 1721      ADM IN  
                                                                              
[facility]                                          
[address] [place] [address]

## 2017-11-15 NOTE — NUR
DR. MOHAMUD ASKING WHY IV FLUIDS HAVE NOT BEEN STARTED, THAT HE ORDERED THEM
YESTERDAY. INFOMRED DR. MOHAMUD THAT DAY SHIFT NURSE FROM YESTERDAY WANTED THE
FLUID ORDERES TO BE DOUBLE CHECKED BY RENAL, BECAUSE ORDER STATED THAT PT WAS
ADMITTED FOR HYPERCALCEMIA. DR. MOHAMUD STATED " PT WAS ADMITTED WITH
HYPOCALCEMIA DID ANY BODY CHECK HIS LABS." INFORMED DR. MOHAMUD THAT FLUIDS
WILL BE STARTED ASAP. ORDERS FAXED TO PHARMACY AT THIS TIME.

## 2017-11-15 NOTE — NUR
PATIENT SLEEPING. LABS DRAWN . DENIES ANY NEEDS AT THIS TIME. CALL LIGHT IN
REACH, BED IN LOW POSITION.

## 2017-11-15 NOTE — NUR
FLUIDS STARTED AT THIS TIME TO INFUSE AT 75CC/HR. PT IN BED, WATCHING TV,
DENIES ANY NEEDS AT THIS TIME. CALL LIGHT IN REACH, NAD NOTED, WILL CONTINUE
PLAN OF CARE.

## 2017-11-15 NOTE — NUR
PT IN BED RESTING QUIETLY. BREATHING EVEN AND UNLABORED. BED RAILS UP X2.
DENIES ANY PAIN OR NEEDS AT THIS TIME. BED LOW, CALL LIGHT WITHIN REACH.

## 2017-11-16 VITALS — SYSTOLIC BLOOD PRESSURE: 139 MMHG | DIASTOLIC BLOOD PRESSURE: 64 MMHG

## 2017-11-16 VITALS — DIASTOLIC BLOOD PRESSURE: 83 MMHG | SYSTOLIC BLOOD PRESSURE: 141 MMHG

## 2017-11-16 VITALS — SYSTOLIC BLOOD PRESSURE: 120 MMHG | DIASTOLIC BLOOD PRESSURE: 80 MMHG

## 2017-11-16 VITALS — SYSTOLIC BLOOD PRESSURE: 133 MMHG | DIASTOLIC BLOOD PRESSURE: 85 MMHG

## 2017-11-16 VITALS — DIASTOLIC BLOOD PRESSURE: 75 MMHG | SYSTOLIC BLOOD PRESSURE: 137 MMHG

## 2017-11-16 LAB
ALBUMIN SERPL-MCNC: 2.5 G/DL (ref 3.4–5)
ANION GAP SERPL CALC-SCNC: 20 MMOL/L (ref 8–16)
BASOPHILS NFR BLD AUTO: 0.6 % (ref 0–2)
BUN SERPL-MCNC: 52 MG/DL (ref 7–18)
CALCIUM SERPL-MCNC: 7.3 MG/DL (ref 8.5–10.1)
CHLORIDE SERPL-SCNC: 109 MMOL/L (ref 98–107)
CO2 SERPL-SCNC: 15.7 MMOL/L (ref 21–32)
CREAT SERPL-MCNC: 4.3 MG/DL (ref 0.6–1.3)
CREATININE - URINE: 46.5 MG/DL (ref 30–125)
EOSINOPHIL NFR BLD: 8.5 % (ref 0–7)
ERYTHROCYTE [DISTWIDTH] IN BLOOD BY AUTOMATED COUNT: 15.9 % (ref 11.5–14.5)
GLUCOSE SERPL-MCNC: 93 MG/DL (ref 74–106)
HCT VFR BLD CALC: 27.4 % (ref 42–54)
HGB BLD-MCNC: 9.3 G/DL (ref 13.5–17.5)
IMM GRANULOCYTES NFR BLD: 0.3 % (ref 0–5)
LYMPHOCYTES NFR BLD AUTO: 28.5 % (ref 15–50)
MAGNESIUM SERPL-MCNC: 1.8 MG/DL (ref 1.8–2.4)
MCH RBC QN AUTO: 32.1 PG (ref 26–34)
MCHC RBC AUTO-ENTMCNC: 33.9 G/DL (ref 31–37)
MCV RBC: 94.5 FL (ref 80–100)
MONOCYTES NFR BLD: 9.5 % (ref 2–11)
NEUTROPHILS NFR BLD AUTO: 52.6 % (ref 40–80)
OSMOLALITY SERPL CALC.SUM OF ELEC: 292 MOSM/KG (ref 275–300)
PHOSPHATE SERPL-MCNC: 6 MG/DL (ref 2.5–4.9)
PLATELET # BLD: 74 10X3/UL (ref 130–400)
PMV BLD AUTO: 11.7 FL (ref 7.4–10.4)
POTASSIUM SERPL-SCNC: 4.7 MMOL/L (ref 3.5–5.1)
PROT UR-MCNC: 395.6 MG/DL (ref 0–11.9)
RBC # BLD AUTO: 2.9 10X6/UL (ref 4.2–6.1)
SODIUM SERPL-SCNC: 140 MMOL/L (ref 136–145)
WBC # BLD AUTO: 6.2 10X3/UL (ref 4.8–10.8)

## 2017-11-16 NOTE — NUR
Patient in bed resting at this time.  Roused easily.  Patient denies any pain
or discomfort.  No signs or symptoms of distress are noted at this time.  Will
continue to assess and monitor patient.

## 2017-11-16 NOTE — NUR
PT IN BED, DENIES ANY NEEDS AT THIS TIME. SKIN TEAR TO LT ELBOW NOTED,
DRESSING IN PLACE. WIFE AT BEDSIDE, CALL LIGHT IN REACH, NAD NOTED, WILL
CONTINUE PLAN OF CARE.

## 2017-11-17 VITALS — SYSTOLIC BLOOD PRESSURE: 113 MMHG | DIASTOLIC BLOOD PRESSURE: 64 MMHG

## 2017-11-17 VITALS — DIASTOLIC BLOOD PRESSURE: 61 MMHG | SYSTOLIC BLOOD PRESSURE: 123 MMHG

## 2017-11-17 VITALS — DIASTOLIC BLOOD PRESSURE: 72 MMHG | SYSTOLIC BLOOD PRESSURE: 115 MMHG

## 2017-11-17 LAB
ALBUMIN SERPL-MCNC: 2.4 G/DL (ref 3.4–5)
ALP SERPL-CCNC: 57 U/L (ref 46–116)
ALT SERPL-CCNC: 14 U/L (ref 10–68)
ANION GAP SERPL CALC-SCNC: 16.2 MMOL/L (ref 8–16)
BILIRUB SERPL-MCNC: 0.4 MG/DL (ref 0.2–1.3)
BUN SERPL-MCNC: 59 MG/DL (ref 7–18)
CALCIUM SERPL-MCNC: 7.2 MG/DL (ref 8.5–10.1)
CHLORIDE SERPL-SCNC: 110 MMOL/L (ref 98–107)
CO2 SERPL-SCNC: 16.2 MMOL/L (ref 21–32)
CREAT SERPL-MCNC: 4.7 MG/DL (ref 0.6–1.3)
GLOBULIN SER-MCNC: 2.9 G/L
GLUCOSE SERPL-MCNC: 86 MG/DL (ref 74–106)
MAGNESIUM SERPL-MCNC: 1.6 MG/DL (ref 1.8–2.4)
OSMOLALITY SERPL CALC.SUM OF ELEC: 291 MOSM/KG (ref 275–300)
PHOSPHATE SERPL-MCNC: 5.9 MG/DL (ref 2.5–4.9)
POTASSIUM SERPL-SCNC: 4.4 MMOL/L (ref 3.5–5.1)
PROT SERPL-MCNC: 5.3 G/DL (ref 6.4–8.2)
SODIUM SERPL-SCNC: 138 MMOL/L (ref 136–145)

## 2017-11-17 NOTE — NUR
AM ROUNDS- PT IN BED, WITH EYES CLOSED, EASILY AROUSES TO VOICE. RESP EVEN AND
UNLABORED. LT FA SL. BED LOW AND WHEELS LOCKED, BEDSIDE RAILS X2, CALL LIGHT
IN REACH, PT DENIES ANY NEEDS AT THIS TIME. NAD NOTED, WILL CONTINUE PLAN OF
CARE.

## 2017-11-17 NOTE — NUR
AM MEDS GIVEN AT THIS TIME. PT UP TO SIDE OF BED, EATING BREAKFAST, DENIES ANY
NEEDS AT THIS TIME. CALL LIGHT IN REACH, NAD NOTED.

## 2017-11-17 NOTE — NUR
Nutrition follow-up/consult:
Diet: Renal
PO intake % of meals
Labs reviewed
Wt: 151# - pt is 87% of IBW
Will continue to provide food choices with selective menus and honor food
preferences within diet restrictions.
RDN will order Nepro with meals to increase kcal/protein intake.
Following.

## 2017-11-17 NOTE — NUR
PROVIDED VERBAL AND WRITTEN DISCHARGE TEACHING TO PT AND WIFE AT BEDSIDE. BOTH
VERBALIZED UNDERSTANDING REGARDING TEACHING. D/C LT FA IV TIP INTACT, APPLIED
2X2 GAUZE AND TAPE. ALSO PROVIDED PT WITH RENAL DIET INFORMATION.
 
 
1119- PT LEFT UNIT VIA WHEELCHAIR, ACCOMPANIED BY WIFE, NAD NOTED.

## 2017-11-17 NOTE — NUR
Patient Name: KIRA UP
Admission Status: Elective
Accout number: [id number]
Admission Date: 2017
: 1935
Admission Diagnosis:HYPOCALCEMIA
Attending: EUSEBIA MOHAMUD
Current LOS:  3
 
Anticipated DC Date:
2017
Planned Disposition: Home with Home Health
Primary Insurance: [employer]
 
 
Discharge Planning Comments: CM MET WITH PATIENT TO DISCUSS DISCHARGE PLAN.
PATIENT STATED HE LIVES AT HOME WITH HIS WIFE GHAZAL, AND SHE WILL BE HIS
TRANSPORT HOME. HE SAID THAT HE HAS OXYGEN HE WEARS AS NEEDED AT HOME, BUT
CANNOT REMEMBER THE NAME OF THE COMPANY THAT SUPPLIES IT. DISCUSSED WITH THE
PATIENT THAT DR MOHAMUD HAS ORDERED HOME HEALTH. REVIEWED THE CHOICES AND
PATIENT CHOICE FORM SIGNED. SPOKE WITH NABILA JACQUES Gillett AT HOME. REFERRAL
SENT. PATIENT DENIES ANY OTHER NEEDS AT THIS TIME. I HAVE EXPLAINED THAT I AM
AVAILABLE UNTIL HE LEAVES IF HE WERE TO THINK OF SOMETHING HE NEEDS.
 
 
: Shaila Ray
 
Is the patient Alert and Oriented? Yes
* How many steps to enter\exit or inside your home? 4, RAILS
* PCP DR MOHAMUD
* Pharmacy [facility] [address] [place]
* Preadmission Environment Home with Family
* ADLs Independent
* Equipment Oxygen
* List name and contact numbers for known caregivers / representatives who
currently or will assist patient after discharge: GHAZAL UP, WIFE,
[phone]
* Community resources currently utilized None
* Please name any agencies selected above. PT IS UNSURE OF WHO HIS OXYGEN
PROVIDER IS
* Additional services required to return to the preadmission environment? Yes
* Can the patient safely return to the preadmission environment? Yes
* Has this patient been hospitalized within the prior 30 days at any hospital?
No

## 2017-11-28 NOTE — DS
PATIENT:KIRA UP                   :[birth year]   MEDICAL RECORD: [id number]
 
                              DISCHARGE SUMMARY
                                                         
ADMISSION DATE:    17                       DISCHARGE DATE:     17
 
 
DATE OF ADMISSION:  2017
 
DATE OF DISCHARGE:  2017
 
ADMISSION DIAGNOSES:  Acute-on-chronic kidney disease, hypocalcemia, anemia of
chronic disease.
 
DISCHARGE DIAGNOSES:  Stage IV chronic kidney disease, secondary
hyperparathyroidism of renal origin, anemia of chronic disease, hypocalcemia,
vitamin D deficiency.
 
CONSULTS:  Dr. Zelaya, nephrology.
 
IMAGING:  Renal ultrasound:  No significant abnormalities.  No hydronephrosis.
 
Chest x-ray:  Chronic emphysematous changes.  No acute disease consistent with
history of pulmonary fibrosis.
 
HOSPITAL COURSE:  The patient was admitted from the clinic, significant change
in renal function, hypocalcemia.  Parathyroid found to be significantly
elevated.  Significant vitamin D deficiency.  Nephrology consulted.  Medications
adjusted.  Concern with the patient's worsening confusion.  This has resolved
with changes in medications.  The patient is anxious to go home.  Discharged
home in stable condition.
 
PHYSICAL EXAMINATION:
VITAL SIGNS ON DISCHARGE:  Temperature 99, blood pressure is 123/61, heart rate
74, respirations 18, O2 sats 95% on room air.
 
DISCHARGE MEDICATIONS:  Per med rec.
 
DISCHARGE INSTRUCTIONS:  The patient will follow up in the clinic in 10 days,
will follow up with nephrology in 2 weeks.  We will ask home health to see the
patient to verify compliance with medications and supportive care.  Agree with
assessment by nephrology.
 
TRANSINT:[id number] Voice Confirmation ID: [id number] DOCUMENT ID: [id number]
                                           
                                           EUSEBIA MOHAMUD DO            
 
 
 
Electronically Signed by EUSEBIA HOWE on 17 at 0759
CC:                                                             [id number]
DICTATION DATE: 17 0800     :     17 1435      DIS IN  
                                                                      17
[place] [phone]

## 2017-12-24 ENCOUNTER — HOSPITAL ENCOUNTER (INPATIENT)
Dept: HOSPITAL 84 - D.ER | Age: 82
LOS: 9 days | Discharge: HOME HEALTH | DRG: 189 | End: 2018-01-02
Attending: FAMILY MEDICINE | Admitting: FAMILY MEDICINE
Payer: MEDICARE

## 2017-12-24 VITALS
WEIGHT: 150 LBS | BODY MASS INDEX: 21 KG/M2 | BODY MASS INDEX: 21 KG/M2 | BODY MASS INDEX: 21 KG/M2 | HEIGHT: 71 IN | WEIGHT: 150 LBS | HEIGHT: 71 IN

## 2017-12-24 VITALS — DIASTOLIC BLOOD PRESSURE: 60 MMHG | SYSTOLIC BLOOD PRESSURE: 116 MMHG

## 2017-12-24 DIAGNOSIS — I25.10: ICD-10-CM

## 2017-12-24 DIAGNOSIS — E83.39: ICD-10-CM

## 2017-12-24 DIAGNOSIS — N25.81: ICD-10-CM

## 2017-12-24 DIAGNOSIS — D63.8: ICD-10-CM

## 2017-12-24 DIAGNOSIS — D69.6: ICD-10-CM

## 2017-12-24 DIAGNOSIS — E87.2: ICD-10-CM

## 2017-12-24 DIAGNOSIS — N18.4: ICD-10-CM

## 2017-12-24 DIAGNOSIS — J44.1: ICD-10-CM

## 2017-12-24 DIAGNOSIS — R07.9: ICD-10-CM

## 2017-12-24 DIAGNOSIS — J84.112: ICD-10-CM

## 2017-12-24 DIAGNOSIS — J96.21: Primary | ICD-10-CM

## 2017-12-24 DIAGNOSIS — Z87.891: ICD-10-CM

## 2017-12-24 DIAGNOSIS — I71.4: ICD-10-CM

## 2017-12-24 DIAGNOSIS — N17.9: ICD-10-CM

## 2017-12-24 DIAGNOSIS — J45.20: ICD-10-CM

## 2017-12-24 DIAGNOSIS — I08.1: ICD-10-CM

## 2017-12-24 DIAGNOSIS — D63.1: ICD-10-CM

## 2017-12-24 LAB
ALBUMIN SERPL-MCNC: 2.7 G/DL (ref 3.4–5)
ALP SERPL-CCNC: 76 U/L (ref 46–116)
ALT SERPL-CCNC: 16 U/L (ref 10–68)
ANION GAP SERPL CALC-SCNC: 19.8 MMOL/L (ref 8–16)
BASOPHILS NFR BLD AUTO: 0.3 % (ref 0–2)
BILIRUB SERPL-MCNC: 0.41 MG/DL (ref 0.2–1.3)
BUN SERPL-MCNC: 66 MG/DL (ref 7–18)
CALCIUM SERPL-MCNC: 7.6 MG/DL (ref 8.5–10.1)
CHLORIDE SERPL-SCNC: 108 MMOL/L (ref 98–107)
CK MB SERPL-MCNC: 2.5 U/L (ref 0–3.6)
CK SERPL-CCNC: 114 UL (ref 21–232)
CO2 SERPL-SCNC: 18.4 MMOL/L (ref 21–32)
CREAT SERPL-MCNC: 5.3 MG/DL (ref 0.6–1.3)
EOSINOPHIL NFR BLD: 0.8 % (ref 0–7)
ERYTHROCYTE [DISTWIDTH] IN BLOOD BY AUTOMATED COUNT: 15.9 % (ref 11.5–14.5)
GLOBULIN SER-MCNC: 3.5 G/L
GLUCOSE SERPL-MCNC: 142 MG/DL (ref 74–106)
HCT VFR BLD CALC: 27.1 % (ref 42–54)
HGB BLD-MCNC: 9 G/DL (ref 13.5–17.5)
IMM GRANULOCYTES NFR BLD: 0.3 % (ref 0–5)
LYMPHOCYTES NFR BLD AUTO: 4.9 % (ref 15–50)
MAGNESIUM SERPL-MCNC: 1.8 MG/DL (ref 1.8–2.4)
MCH RBC QN AUTO: 32.6 PG (ref 26–34)
MCHC RBC AUTO-ENTMCNC: 33.2 G/DL (ref 31–37)
MCV RBC: 98.2 FL (ref 80–100)
MONOCYTES NFR BLD: 6.2 % (ref 2–11)
NEUTROPHILS NFR BLD AUTO: 87.5 % (ref 40–80)
NT-PROBNP SERPL-MCNC: (no result) PG/ML (ref 0–450)
OSMOLALITY SERPL CALC.SUM OF ELEC: 301 MOSM/KG (ref 275–300)
PLATELET # BLD: 76 10X3/UL (ref 130–400)
PMV BLD AUTO: 10.3 FL (ref 7.4–10.4)
POTASSIUM SERPL-SCNC: 5.2 MMOL/L (ref 3.5–5.1)
PROT SERPL-MCNC: 6.2 G/DL (ref 6.4–8.2)
RBC # BLD AUTO: 2.76 10X6/UL (ref 4.2–6.1)
SODIUM SERPL-SCNC: 141 MMOL/L (ref 136–145)
TROPONIN I SERPL-MCNC: 0.06 NG/ML (ref 0–0.06)
WBC # BLD AUTO: 9.9 10X3/UL (ref 4.8–10.8)

## 2017-12-25 VITALS — DIASTOLIC BLOOD PRESSURE: 57 MMHG | SYSTOLIC BLOOD PRESSURE: 103 MMHG

## 2017-12-25 VITALS — SYSTOLIC BLOOD PRESSURE: 121 MMHG | DIASTOLIC BLOOD PRESSURE: 53 MMHG

## 2017-12-25 VITALS — SYSTOLIC BLOOD PRESSURE: 112 MMHG | DIASTOLIC BLOOD PRESSURE: 54 MMHG

## 2017-12-25 VITALS — SYSTOLIC BLOOD PRESSURE: 117 MMHG | DIASTOLIC BLOOD PRESSURE: 60 MMHG

## 2017-12-25 VITALS — SYSTOLIC BLOOD PRESSURE: 109 MMHG | DIASTOLIC BLOOD PRESSURE: 63 MMHG

## 2017-12-25 VITALS — DIASTOLIC BLOOD PRESSURE: 52 MMHG | SYSTOLIC BLOOD PRESSURE: 105 MMHG

## 2017-12-25 LAB
CK MB SERPL-MCNC: 2.9 U/L (ref 0–3.6)
CK MB SERPL-MCNC: 3.7 U/L (ref 0–3.6)
CK MB SERPL-MCNC: 4.3 U/L (ref 0–3.6)
CK SERPL-CCNC: 129 UL (ref 21–232)
CK SERPL-CCNC: 154 UL (ref 21–232)
CK SERPL-CCNC: 180 UL (ref 21–232)
TROPONIN I SERPL-MCNC: 2.02 NG/ML (ref 0–0.06)
TROPONIN I SERPL-MCNC: 2.07 NG/ML (ref 0–0.06)
TROPONIN I SERPL-MCNC: 2.11 NG/ML (ref 0–0.06)

## 2017-12-26 VITALS — SYSTOLIC BLOOD PRESSURE: 114 MMHG | DIASTOLIC BLOOD PRESSURE: 64 MMHG

## 2017-12-26 VITALS — SYSTOLIC BLOOD PRESSURE: 117 MMHG | DIASTOLIC BLOOD PRESSURE: 64 MMHG

## 2017-12-26 VITALS — SYSTOLIC BLOOD PRESSURE: 124 MMHG | DIASTOLIC BLOOD PRESSURE: 80 MMHG

## 2017-12-26 VITALS — SYSTOLIC BLOOD PRESSURE: 96 MMHG | DIASTOLIC BLOOD PRESSURE: 48 MMHG

## 2017-12-26 VITALS — DIASTOLIC BLOOD PRESSURE: 61 MMHG | SYSTOLIC BLOOD PRESSURE: 115 MMHG

## 2017-12-26 LAB
ALBUMIN SERPL-MCNC: 2.3 G/DL (ref 3.4–5)
ALP SERPL-CCNC: 69 U/L (ref 46–116)
ALT SERPL-CCNC: 16 U/L (ref 10–68)
ANION GAP SERPL CALC-SCNC: 20 MMOL/L (ref 8–16)
APPEARANCE UR: CLEAR
BASOPHILS NFR BLD AUTO: 0 % (ref 0–2)
BILIRUB SERPL-MCNC: 0.2 MG/DL (ref 0.2–1.3)
BILIRUB SERPL-MCNC: NEGATIVE MG/DL
BUN SERPL-MCNC: 87 MG/DL (ref 7–18)
CALCIUM SERPL-MCNC: 7.7 MG/DL (ref 8.5–10.1)
CHLORIDE SERPL-SCNC: 104 MMOL/L (ref 98–107)
CO2 SERPL-SCNC: 17.6 MMOL/L (ref 21–32)
COLOR UR: YELLOW
CREAT SERPL-MCNC: 5.9 MG/DL (ref 0.6–1.3)
CREATININE - URINE: 63.5 MG/DL (ref 30–125)
EOSINOPHIL NFR BLD: 0 % (ref 0–7)
ERYTHROCYTE [DISTWIDTH] IN BLOOD BY AUTOMATED COUNT: 15.8 % (ref 11.5–14.5)
GLOBULIN SER-MCNC: 3.3 G/L
GLUCOSE SERPL-MCNC: 186 MG/DL (ref 74–106)
GLUCOSE SERPL-MCNC: 50 MG/DL
HCT VFR BLD CALC: 22.4 % (ref 42–54)
HGB BLD-MCNC: 7.6 G/DL (ref 13.5–17.5)
IMM GRANULOCYTES NFR BLD: 0.4 % (ref 0–5)
KETONES UR STRIP-MCNC: NEGATIVE MG/DL
LYMPHOCYTES NFR BLD AUTO: 3.8 % (ref 15–50)
MCH RBC QN AUTO: 32.5 PG (ref 26–34)
MCHC RBC AUTO-ENTMCNC: 33.9 G/DL (ref 31–37)
MCV RBC: 95.7 FL (ref 80–100)
MONOCYTES NFR BLD: 3.6 % (ref 2–11)
NEUTROPHILS NFR BLD AUTO: 92.2 % (ref 40–80)
NITRITE UR-MCNC: NEGATIVE MG/ML
OSMOLALITY SERPL CALC.SUM OF ELEC: 305 MOSM/KG (ref 275–300)
PH UR STRIP: 5 [PH] (ref 5–6)
PHOSPHATE SERPL-MCNC: 5.9 MG/DL (ref 2.5–4.9)
PLATELET # BLD: 72 10X3/UL (ref 130–400)
PMV BLD AUTO: 11 FL (ref 7.4–10.4)
POTASSIUM SERPL-SCNC: 4.6 MMOL/L (ref 3.5–5.1)
PROT SERPL-MCNC: 5.6 G/DL (ref 6.4–8.2)
PROT UR-MCNC: (no result) MG/DL
PROT UR-MCNC: 348.9 MG/DL (ref 0–11.9)
PROT/CREAT UR: 5.5 MG/G
RBC # BLD AUTO: 2.34 10X6/UL (ref 4.2–6.1)
RBC #/AREA URNS HPF: (no result) /HPF (ref 0–5)
SODIUM SERPL-SCNC: 137 MMOL/L (ref 136–145)
SODIUM UR-SCNC: 55 MMOL/L (ref 20–110)
SP GR UR STRIP: 1.01 (ref 1–1.02)
UROBILINOGEN UR-MCNC: NORMAL MG/DL
WBC # BLD AUTO: 8.5 10X3/UL (ref 4.8–10.8)
WBC #/AREA URNS HPF: (no result) /HPF (ref 0–5)

## 2017-12-27 VITALS — SYSTOLIC BLOOD PRESSURE: 129 MMHG | DIASTOLIC BLOOD PRESSURE: 72 MMHG

## 2017-12-27 VITALS — SYSTOLIC BLOOD PRESSURE: 124 MMHG | DIASTOLIC BLOOD PRESSURE: 71 MMHG

## 2017-12-27 VITALS — DIASTOLIC BLOOD PRESSURE: 72 MMHG | SYSTOLIC BLOOD PRESSURE: 123 MMHG

## 2017-12-27 VITALS — SYSTOLIC BLOOD PRESSURE: 120 MMHG | DIASTOLIC BLOOD PRESSURE: 73 MMHG

## 2017-12-27 VITALS — DIASTOLIC BLOOD PRESSURE: 70 MMHG | SYSTOLIC BLOOD PRESSURE: 111 MMHG

## 2017-12-27 VITALS — SYSTOLIC BLOOD PRESSURE: 130 MMHG | DIASTOLIC BLOOD PRESSURE: 69 MMHG

## 2017-12-27 VITALS — DIASTOLIC BLOOD PRESSURE: 73 MMHG | SYSTOLIC BLOOD PRESSURE: 118 MMHG

## 2017-12-27 VITALS — DIASTOLIC BLOOD PRESSURE: 72 MMHG | SYSTOLIC BLOOD PRESSURE: 120 MMHG

## 2017-12-27 VITALS — DIASTOLIC BLOOD PRESSURE: 61 MMHG | SYSTOLIC BLOOD PRESSURE: 122 MMHG

## 2017-12-27 VITALS — DIASTOLIC BLOOD PRESSURE: 65 MMHG | SYSTOLIC BLOOD PRESSURE: 118 MMHG

## 2017-12-27 VITALS — DIASTOLIC BLOOD PRESSURE: 70 MMHG | SYSTOLIC BLOOD PRESSURE: 126 MMHG

## 2017-12-27 VITALS — SYSTOLIC BLOOD PRESSURE: 121 MMHG | DIASTOLIC BLOOD PRESSURE: 64 MMHG

## 2017-12-27 LAB
ANION GAP SERPL CALC-SCNC: 20.1 MMOL/L (ref 8–16)
BASOPHILS NFR BLD AUTO: 0 % (ref 0–2)
BUN SERPL-MCNC: 104 MG/DL (ref 7–18)
CALCIUM SERPL-MCNC: 8.1 MG/DL (ref 8.5–10.1)
CHLORIDE SERPL-SCNC: 103 MMOL/L (ref 98–107)
CO2 SERPL-SCNC: 18.6 MMOL/L (ref 21–32)
CREAT SERPL-MCNC: 5.9 MG/DL (ref 0.6–1.3)
EOSINOPHIL NFR BLD: 0 % (ref 0–7)
ERYTHROCYTE [DISTWIDTH] IN BLOOD BY AUTOMATED COUNT: 15.7 % (ref 11.5–14.5)
GLUCOSE SERPL-MCNC: 178 MG/DL (ref 74–106)
HCT VFR BLD CALC: 21.5 % (ref 42–54)
HGB BLD-MCNC: 7.5 G/DL (ref 13.5–17.5)
IMM GRANULOCYTES NFR BLD: 0.5 % (ref 0–5)
LYMPHOCYTES NFR BLD AUTO: 4.2 % (ref 15–50)
MCH RBC QN AUTO: 31.7 PG (ref 26–34)
MCHC RBC AUTO-ENTMCNC: 33.5 G/DL (ref 31–37)
MCV RBC: 94.7 FL (ref 80–100)
MONOCYTES NFR BLD: 4.3 % (ref 2–11)
NEUTROPHILS NFR BLD AUTO: 91 % (ref 40–80)
OSMOLALITY SERPL CALC.SUM OF ELEC: 310 MOSM/KG (ref 275–300)
PHOSPHATE SERPL-MCNC: 6.4 MG/DL (ref 2.5–4.9)
PLATELET # BLD: 77 10X3/UL (ref 130–400)
PMV BLD AUTO: 11.6 FL (ref 7.4–10.4)
POTASSIUM SERPL-SCNC: 4.7 MMOL/L (ref 3.5–5.1)
RBC # BLD AUTO: 2.27 10X6/UL (ref 4.2–6.1)
SODIUM SERPL-SCNC: 137 MMOL/L (ref 136–145)
WBC # BLD AUTO: 7.6 10X3/UL (ref 4.8–10.8)

## 2017-12-28 VITALS — DIASTOLIC BLOOD PRESSURE: 58 MMHG | SYSTOLIC BLOOD PRESSURE: 109 MMHG

## 2017-12-28 VITALS — SYSTOLIC BLOOD PRESSURE: 135 MMHG | DIASTOLIC BLOOD PRESSURE: 75 MMHG

## 2017-12-28 VITALS — DIASTOLIC BLOOD PRESSURE: 61 MMHG | SYSTOLIC BLOOD PRESSURE: 117 MMHG

## 2017-12-28 VITALS — SYSTOLIC BLOOD PRESSURE: 109 MMHG | DIASTOLIC BLOOD PRESSURE: 76 MMHG

## 2017-12-28 VITALS — DIASTOLIC BLOOD PRESSURE: 78 MMHG | SYSTOLIC BLOOD PRESSURE: 149 MMHG

## 2017-12-28 LAB
ANION GAP SERPL CALC-SCNC: 20.6 MMOL/L (ref 8–16)
BASOPHILS NFR BLD AUTO: 0 % (ref 0–2)
BUN SERPL-MCNC: 110 MG/DL (ref 7–18)
CALCIUM SERPL-MCNC: 8 MG/DL (ref 8.5–10.1)
CHLORIDE SERPL-SCNC: 104 MMOL/L (ref 98–107)
CO2 SERPL-SCNC: 18.1 MMOL/L (ref 21–32)
CREAT SERPL-MCNC: 5.8 MG/DL (ref 0.6–1.3)
EOSINOPHIL NFR BLD: 0 % (ref 0–7)
ERYTHROCYTE [DISTWIDTH] IN BLOOD BY AUTOMATED COUNT: 19.3 % (ref 11.5–14.5)
GLUCOSE SERPL-MCNC: 136 MG/DL (ref 74–106)
HCT VFR BLD CALC: 26.8 % (ref 42–54)
HGB BLD-MCNC: 9.1 G/DL (ref 13.5–17.5)
IMM GRANULOCYTES NFR BLD: 1.2 % (ref 0–5)
LYMPHOCYTES NFR BLD AUTO: 5.2 % (ref 15–50)
MAGNESIUM SERPL-MCNC: 1.9 MG/DL (ref 1.8–2.4)
MCH RBC QN AUTO: 30.7 PG (ref 26–34)
MCHC RBC AUTO-ENTMCNC: 34 G/DL (ref 31–37)
MCV RBC: 90.5 FL (ref 80–100)
MONOCYTES NFR BLD: 5.3 % (ref 2–11)
NEUTROPHILS NFR BLD AUTO: 88.3 % (ref 40–80)
OSMOLALITY SERPL CALC.SUM OF ELEC: 312 MOSM/KG (ref 275–300)
PHOSPHATE SERPL-MCNC: 6.8 MG/DL (ref 2.5–4.9)
PLATELET # BLD: 67 10X3/UL (ref 130–400)
POTASSIUM SERPL-SCNC: 4.7 MMOL/L (ref 3.5–5.1)
RBC # BLD AUTO: 2.96 10X6/UL (ref 4.2–6.1)
SODIUM SERPL-SCNC: 138 MMOL/L (ref 136–145)
WBC # BLD AUTO: 6.6 10X3/UL (ref 4.8–10.8)

## 2017-12-29 VITALS — DIASTOLIC BLOOD PRESSURE: 105 MMHG | SYSTOLIC BLOOD PRESSURE: 164 MMHG

## 2017-12-29 VITALS — DIASTOLIC BLOOD PRESSURE: 35 MMHG | SYSTOLIC BLOOD PRESSURE: 113 MMHG

## 2017-12-29 VITALS — DIASTOLIC BLOOD PRESSURE: 65 MMHG | SYSTOLIC BLOOD PRESSURE: 127 MMHG

## 2017-12-29 VITALS — DIASTOLIC BLOOD PRESSURE: 82 MMHG | SYSTOLIC BLOOD PRESSURE: 118 MMHG

## 2017-12-29 LAB
ALBUMIN SERPL-MCNC: 2.5 G/DL (ref 3.4–5)
ANION GAP SERPL CALC-SCNC: 20.8 MMOL/L (ref 8–16)
BASOPHILS NFR BLD AUTO: 0 % (ref 0–2)
BUN SERPL-MCNC: 114 MG/DL (ref 7–18)
CALCIUM SERPL-MCNC: 8 MG/DL (ref 8.5–10.1)
CHLORIDE SERPL-SCNC: 103 MMOL/L (ref 98–107)
CK MB SERPL-MCNC: 4.5 U/L (ref 0–3.6)
CO2 SERPL-SCNC: 18.9 MMOL/L (ref 21–32)
CREAT SERPL-MCNC: 5.9 MG/DL (ref 0.6–1.3)
EOSINOPHIL NFR BLD: 0 % (ref 0–7)
ERYTHROCYTE [DISTWIDTH] IN BLOOD BY AUTOMATED COUNT: 18.6 % (ref 11.5–14.5)
GLUCOSE SERPL-MCNC: 141 MG/DL (ref 74–106)
HCT VFR BLD CALC: 27.2 % (ref 42–54)
HGB BLD-MCNC: 9.3 G/DL (ref 13.5–17.5)
IMM GRANULOCYTES NFR BLD: 1.5 % (ref 0–5)
LYMPHOCYTES NFR BLD AUTO: 6.6 % (ref 15–50)
MCH RBC QN AUTO: 30.7 PG (ref 26–34)
MCHC RBC AUTO-ENTMCNC: 34.2 G/DL (ref 31–37)
MCV RBC: 89.8 FL (ref 80–100)
MONOCYTES NFR BLD: 4.8 % (ref 2–11)
NEUTROPHILS NFR BLD AUTO: 87.1 % (ref 40–80)
OSMOLALITY SERPL CALC.SUM OF ELEC: 313 MOSM/KG (ref 275–300)
PHOSPHATE SERPL-MCNC: 6.9 MG/DL (ref 2.5–4.9)
PLATELET # BLD: 64 10X3/UL (ref 130–400)
POTASSIUM SERPL-SCNC: 4.7 MMOL/L (ref 3.5–5.1)
RBC # BLD AUTO: 3.03 10X6/UL (ref 4.2–6.1)
SODIUM SERPL-SCNC: 138 MMOL/L (ref 136–145)
TROPONIN I SERPL-MCNC: 0.92 NG/ML (ref 0–0.06)
WBC # BLD AUTO: 5.5 10X3/UL (ref 4.8–10.8)

## 2017-12-30 VITALS — SYSTOLIC BLOOD PRESSURE: 138 MMHG | DIASTOLIC BLOOD PRESSURE: 78 MMHG

## 2017-12-30 VITALS — SYSTOLIC BLOOD PRESSURE: 140 MMHG | DIASTOLIC BLOOD PRESSURE: 75 MMHG

## 2017-12-30 VITALS — SYSTOLIC BLOOD PRESSURE: 131 MMHG | DIASTOLIC BLOOD PRESSURE: 96 MMHG

## 2017-12-30 VITALS — SYSTOLIC BLOOD PRESSURE: 120 MMHG | DIASTOLIC BLOOD PRESSURE: 80 MMHG

## 2017-12-30 LAB
ANION GAP SERPL CALC-SCNC: 19.8 MMOL/L (ref 8–16)
BASOPHILS NFR BLD AUTO: 0.1 % (ref 0–2)
BUN SERPL-MCNC: 118 MG/DL (ref 7–18)
CALCIUM SERPL-MCNC: 7.7 MG/DL (ref 8.5–10.1)
CHLORIDE SERPL-SCNC: 104 MMOL/L (ref 98–107)
CO2 SERPL-SCNC: 19.5 MMOL/L (ref 21–32)
CREAT SERPL-MCNC: 5.5 MG/DL (ref 0.6–1.3)
EOSINOPHIL NFR BLD: 0.8 % (ref 0–7)
ERYTHROCYTE [DISTWIDTH] IN BLOOD BY AUTOMATED COUNT: 18.9 % (ref 11.5–14.5)
GLUCOSE SERPL-MCNC: 96 MG/DL (ref 74–106)
HCT VFR BLD CALC: 32.5 % (ref 42–54)
HGB BLD-MCNC: 11.2 G/DL (ref 13.5–17.5)
IMM GRANULOCYTES NFR BLD: 2.2 % (ref 0–5)
LYMPHOCYTES NFR BLD AUTO: 18.6 % (ref 15–50)
MAGNESIUM SERPL-MCNC: 1.9 MG/DL (ref 1.8–2.4)
MCH RBC QN AUTO: 30.4 PG (ref 26–34)
MCHC RBC AUTO-ENTMCNC: 34.5 G/DL (ref 31–37)
MCV RBC: 88.1 FL (ref 80–100)
MONOCYTES NFR BLD: 9.2 % (ref 2–11)
NEUTROPHILS NFR BLD AUTO: 69.1 % (ref 40–80)
OSMOLALITY SERPL CALC.SUM OF ELEC: 315 MOSM/KG (ref 275–300)
PHOSPHATE SERPL-MCNC: 6 MG/DL (ref 2.5–4.9)
PLATELET # BLD: 64 10X3/UL (ref 130–400)
POTASSIUM SERPL-SCNC: 4.3 MMOL/L (ref 3.5–5.1)
RBC # BLD AUTO: 3.69 10X6/UL (ref 4.2–6.1)
SODIUM SERPL-SCNC: 139 MMOL/L (ref 136–145)
WBC # BLD AUTO: 7.6 10X3/UL (ref 4.8–10.8)

## 2017-12-31 VITALS — SYSTOLIC BLOOD PRESSURE: 149 MMHG | DIASTOLIC BLOOD PRESSURE: 82 MMHG

## 2017-12-31 VITALS — SYSTOLIC BLOOD PRESSURE: 122 MMHG | DIASTOLIC BLOOD PRESSURE: 78 MMHG

## 2017-12-31 VITALS — SYSTOLIC BLOOD PRESSURE: 130 MMHG | DIASTOLIC BLOOD PRESSURE: 76 MMHG

## 2017-12-31 VITALS — DIASTOLIC BLOOD PRESSURE: 79 MMHG | SYSTOLIC BLOOD PRESSURE: 167 MMHG

## 2017-12-31 VITALS — DIASTOLIC BLOOD PRESSURE: 89 MMHG | SYSTOLIC BLOOD PRESSURE: 161 MMHG

## 2017-12-31 LAB
ANION GAP SERPL CALC-SCNC: 18.7 MMOL/L (ref 8–16)
BASOPHILS NFR BLD AUTO: 0.1 % (ref 0–2)
BUN SERPL-MCNC: 131 MG/DL (ref 7–18)
CALCIUM SERPL-MCNC: 7.4 MG/DL (ref 8.5–10.1)
CHLORIDE SERPL-SCNC: 102 MMOL/L (ref 98–107)
CO2 SERPL-SCNC: 21.2 MMOL/L (ref 21–32)
CREAT SERPL-MCNC: 5.5 MG/DL (ref 0.6–1.3)
EOSINOPHIL NFR BLD: 0 % (ref 0–7)
ERYTHROCYTE [DISTWIDTH] IN BLOOD BY AUTOMATED COUNT: 18.3 % (ref 11.5–14.5)
GLUCOSE SERPL-MCNC: 156 MG/DL (ref 74–106)
HCT VFR BLD CALC: 35.2 % (ref 42–54)
HGB BLD-MCNC: 12 G/DL (ref 13.5–17.5)
IMM GRANULOCYTES NFR BLD: 4.7 % (ref 0–5)
LYMPHOCYTES NFR BLD AUTO: 6.5 % (ref 15–50)
MCH RBC QN AUTO: 30.2 PG (ref 26–34)
MCHC RBC AUTO-ENTMCNC: 34.1 G/DL (ref 31–37)
MCV RBC: 88.7 FL (ref 80–100)
MONOCYTES NFR BLD: 4.2 % (ref 2–11)
NEUTROPHILS NFR BLD AUTO: 84.5 % (ref 40–80)
OSMOLALITY SERPL CALC.SUM OF ELEC: 318 MOSM/KG (ref 275–300)
PLATELET # BLD: 67 10X3/UL (ref 130–400)
POTASSIUM SERPL-SCNC: 4.9 MMOL/L (ref 3.5–5.1)
RBC # BLD AUTO: 3.97 10X6/UL (ref 4.2–6.1)
SODIUM SERPL-SCNC: 137 MMOL/L (ref 136–145)
WBC # BLD AUTO: 7 10X3/UL (ref 4.8–10.8)

## 2018-01-01 VITALS — SYSTOLIC BLOOD PRESSURE: 143 MMHG | DIASTOLIC BLOOD PRESSURE: 87 MMHG

## 2018-01-01 VITALS — SYSTOLIC BLOOD PRESSURE: 143 MMHG | DIASTOLIC BLOOD PRESSURE: 82 MMHG

## 2018-01-01 VITALS — SYSTOLIC BLOOD PRESSURE: 155 MMHG | DIASTOLIC BLOOD PRESSURE: 80 MMHG

## 2018-01-01 VITALS — SYSTOLIC BLOOD PRESSURE: 157 MMHG | DIASTOLIC BLOOD PRESSURE: 75 MMHG

## 2018-01-01 VITALS — SYSTOLIC BLOOD PRESSURE: 146 MMHG | DIASTOLIC BLOOD PRESSURE: 72 MMHG

## 2018-01-01 LAB
ALBUMIN SERPL-MCNC: 2.5 G/DL (ref 3.4–5)
ALP SERPL-CCNC: 80 U/L (ref 46–116)
ALT SERPL-CCNC: 44 U/L (ref 10–68)
ANION GAP SERPL CALC-SCNC: 17.2 MMOL/L (ref 8–16)
BASOPHILS NFR BLD AUTO: 0 % (ref 0–2)
BILIRUB SERPL-MCNC: 0.74 MG/DL (ref 0.2–1.3)
BUN SERPL-MCNC: 128 MG/DL (ref 7–18)
CALCIUM SERPL-MCNC: 7.5 MG/DL (ref 8.5–10.1)
CHLORIDE SERPL-SCNC: 101 MMOL/L (ref 98–107)
CO2 SERPL-SCNC: 22.5 MMOL/L (ref 21–32)
CREAT SERPL-MCNC: 5.5 MG/DL (ref 0.6–1.3)
EOSINOPHIL NFR BLD: 0.2 % (ref 0–7)
ERYTHROCYTE [DISTWIDTH] IN BLOOD BY AUTOMATED COUNT: 18 % (ref 11.5–14.5)
FERRITIN SERPL-MCNC: 686 NG/ML (ref 3–244)
GLOBULIN SER-MCNC: 2.9 G/L
GLUCOSE SERPL-MCNC: 142 MG/DL (ref 74–106)
HCT VFR BLD CALC: 34.8 % (ref 42–54)
HGB BLD-MCNC: 11.9 G/DL (ref 13.5–17.5)
IMM GRANULOCYTES NFR BLD: 4.3 % (ref 0–5)
LYMPHOCYTES NFR BLD AUTO: 5.5 % (ref 15–50)
MCH RBC QN AUTO: 30.4 PG (ref 26–34)
MCHC RBC AUTO-ENTMCNC: 34.2 G/DL (ref 31–37)
MCV RBC: 88.8 FL (ref 80–100)
MONOCYTES NFR BLD: 4.4 % (ref 2–11)
NEUTROPHILS NFR BLD AUTO: 85.6 % (ref 40–80)
OSMOLALITY SERPL CALC.SUM OF ELEC: 314 MOSM/KG (ref 275–300)
PLATELET # BLD: 65 10X3/UL (ref 130–400)
POTASSIUM SERPL-SCNC: 4.7 MMOL/L (ref 3.5–5.1)
PROT SERPL-MCNC: 5.4 G/DL (ref 6.4–8.2)
RBC # BLD AUTO: 3.92 10X6/UL (ref 4.2–6.1)
SODIUM SERPL-SCNC: 136 MMOL/L (ref 136–145)
WBC # BLD AUTO: 6.5 10X3/UL (ref 4.8–10.8)

## 2018-01-02 VITALS — SYSTOLIC BLOOD PRESSURE: 145 MMHG | DIASTOLIC BLOOD PRESSURE: 86 MMHG

## 2018-01-02 VITALS — SYSTOLIC BLOOD PRESSURE: 146 MMHG | DIASTOLIC BLOOD PRESSURE: 54 MMHG

## 2018-01-02 LAB
ALBUMIN SERPL-MCNC: 2.5 G/DL (ref 3.4–5)
ALP SERPL-CCNC: 81 U/L (ref 46–116)
ALT SERPL-CCNC: 39 U/L (ref 10–68)
ANION GAP SERPL CALC-SCNC: 17.8 MMOL/L (ref 8–16)
BASOPHILS NFR BLD AUTO: 0.1 % (ref 0–2)
BILIRUB SERPL-MCNC: 0.73 MG/DL (ref 0.2–1.3)
BUN SERPL-MCNC: 132 MG/DL (ref 7–18)
CALCIUM SERPL-MCNC: 7.6 MG/DL (ref 8.5–10.1)
CHLORIDE SERPL-SCNC: 101 MMOL/L (ref 98–107)
CO2 SERPL-SCNC: 24.4 MMOL/L (ref 21–32)
CREAT SERPL-MCNC: 5.2 MG/DL (ref 0.6–1.3)
EOSINOPHIL NFR BLD: 1.8 % (ref 0–7)
ERYTHROCYTE [DISTWIDTH] IN BLOOD BY AUTOMATED COUNT: 18 % (ref 11.5–14.5)
GLOBULIN SER-MCNC: 2.8 G/L
GLUCOSE SERPL-MCNC: 92 MG/DL (ref 74–106)
HCT VFR BLD CALC: 35.3 % (ref 42–54)
HGB BLD-MCNC: 12.1 G/DL (ref 13.5–17.5)
IMM GRANULOCYTES NFR BLD: 2.7 % (ref 0–5)
LYMPHOCYTES NFR BLD AUTO: 16.4 % (ref 15–50)
MCH RBC QN AUTO: 30.6 PG (ref 26–34)
MCHC RBC AUTO-ENTMCNC: 34.3 G/DL (ref 31–37)
MCV RBC: 89.4 FL (ref 80–100)
MONOCYTES NFR BLD: 7.3 % (ref 2–11)
NEUTROPHILS NFR BLD AUTO: 71.7 % (ref 40–80)
OSMOLALITY SERPL CALC.SUM OF ELEC: 320 MOSM/KG (ref 275–300)
PLATELET # BLD: 69 10X3/UL (ref 130–400)
POTASSIUM SERPL-SCNC: 4.2 MMOL/L (ref 3.5–5.1)
PROT SERPL-MCNC: 5.3 G/DL (ref 6.4–8.2)
RBC # BLD AUTO: 3.95 10X6/UL (ref 4.2–6.1)
SODIUM SERPL-SCNC: 139 MMOL/L (ref 136–145)
WBC # BLD AUTO: 8.5 10X3/UL (ref 4.8–10.8)

## 2018-01-03 LAB
ALBUMIN SERPL ELPH-MCNC: 3 G/DL (ref 2.9–4.4)
ALBUMIN/GLOB SERPL: 1.5 {RATIO} (ref 0.7–1.7)
ALPHA1 GLOB SERPL ELPH-MCNC: 0.2 G/DL (ref 0–0.4)
ALPHA2 GLOB SERPL ELPH-MCNC: 0.6 G/DL (ref 0.4–1)
B-GLOBULIN SERPL ELPH-MCNC: 0.6 G/DL (ref 0.7–1.3)
EPO SERPL-ACNC: 41.6 MIU/ML (ref 2.6–18.5)
FOLATE SERPL-MCNC: 13.9 NG/ML (ref 3–?)
GAMMA GLOB SERPL ELPH-MCNC: 0.6 G/DL (ref 0.4–1.8)
IGA SERPL-MCNC: 271 MG/DL (ref 61–437)
IGG SERPL-MCNC: 692 MG/DL (ref 700–1600)
IGM SERPL-MCNC: 64 MG/DL (ref 15–143)
PROT SERPL-MCNC: 5 G/DL (ref 6–8.5)
VIT B12 SERPL-MCNC: 1104 PG/ML (ref 232–1245)